# Patient Record
Sex: MALE | Race: WHITE | Employment: OTHER | URBAN - METROPOLITAN AREA
[De-identification: names, ages, dates, MRNs, and addresses within clinical notes are randomized per-mention and may not be internally consistent; named-entity substitution may affect disease eponyms.]

---

## 2018-02-05 RX ORDER — VALSARTAN 80 MG/1
80 TABLET ORAL NIGHTLY
COMMUNITY

## 2018-02-05 RX ORDER — CHOLECALCIFEROL (VITAMIN D3) 1250 MCG
1 CAPSULE ORAL WEEKLY
COMMUNITY

## 2018-02-05 RX ORDER — SIMVASTATIN 80 MG
80 TABLET ORAL NIGHTLY
COMMUNITY

## 2018-02-05 RX ORDER — PAROXETINE HYDROCHLORIDE 20 MG/1
20 TABLET, FILM COATED ORAL NIGHTLY
COMMUNITY

## 2018-02-05 RX ORDER — TRIAMTERENE AND HYDROCHLOROTHIAZIDE 37.5; 25 MG/1; MG/1
1 TABLET ORAL DAILY
COMMUNITY

## 2018-02-05 RX ORDER — METOPROLOL SUCCINATE 100 MG/1
100 TABLET, EXTENDED RELEASE ORAL DAILY
COMMUNITY

## 2018-02-05 RX ORDER — POTASSIUM BICARBONATE 25 MEQ/1
25 TABLET, EFFERVESCENT ORAL DAILY
COMMUNITY

## 2018-02-05 RX ORDER — ALLOPURINOL 300 MG/1
300 TABLET ORAL DAILY
COMMUNITY

## 2018-02-05 RX ORDER — LEVOTHYROXINE SODIUM 0.03 MG/1
25 TABLET ORAL DAILY
COMMUNITY

## 2018-02-05 RX ORDER — M-VIT,TX,IRON,MINS/CALC/FOLIC 27MG-0.4MG
1 TABLET ORAL DAILY
COMMUNITY

## 2018-02-05 RX ORDER — PANTOPRAZOLE SODIUM 40 MG/1
40 TABLET, DELAYED RELEASE ORAL NIGHTLY
COMMUNITY

## 2018-02-07 ENCOUNTER — ANESTHESIA EVENT (OUTPATIENT)
Dept: OPERATING ROOM | Age: 83
End: 2018-02-07
Payer: MEDICARE

## 2018-02-08 ENCOUNTER — ANESTHESIA (OUTPATIENT)
Dept: OPERATING ROOM | Age: 83
End: 2018-02-08
Payer: MEDICARE

## 2018-02-08 ENCOUNTER — HOSPITAL ENCOUNTER (OUTPATIENT)
Age: 83
Setting detail: OUTPATIENT SURGERY
Discharge: HOME | End: 2018-02-08
Attending: OPHTHALMOLOGY | Admitting: OPHTHALMOLOGY
Payer: MEDICARE

## 2018-02-08 VITALS — OXYGEN SATURATION: 100 % | SYSTOLIC BLOOD PRESSURE: 169 MMHG | DIASTOLIC BLOOD PRESSURE: 82 MMHG

## 2018-02-08 VITALS
OXYGEN SATURATION: 99 % | BODY MASS INDEX: 35.89 KG/M2 | SYSTOLIC BLOOD PRESSURE: 159 MMHG | HEIGHT: 66 IN | TEMPERATURE: 97.5 F | DIASTOLIC BLOOD PRESSURE: 69 MMHG | WEIGHT: 223.33 LBS | RESPIRATION RATE: 16 BRPM | HEART RATE: 56 BPM

## 2018-02-08 PROBLEM — H35.371 MACULAR PUCKER, RIGHT EYE: Chronic | Status: ACTIVE | Noted: 2018-02-08

## 2018-02-08 LAB
EKG ATRIAL RATE: 51 BPM
EKG P AXIS: 14 DEGREES
EKG P-R INTERVAL: 218 MS
EKG Q-T INTERVAL: 472 MS
EKG QRS DURATION: 126 MS
EKG QTC CALCULATION (BAZETT): 435 MS
EKG R AXIS: 3 DEGREES
EKG T AXIS: 72 DEGREES
EKG VENTRICULAR RATE: 51 BPM
GFR NON-AFRICAN AMERICAN: 54 ML/MIN
GFR SERPL CREATININE-BSD FRML MDRD: >60 ML/MIN
GFR SERPL CREATININE-BSD FRML MDRD: ABNORMAL ML/MIN/{1.73_M2}
GLUCOSE BLD-MCNC: 135 MG/DL (ref 74–100)
POC CHLORIDE: 106 MMOL/L (ref 98–107)
POC CREATININE: 1.28 MG/DL (ref 0.51–1.19)
POC POTASSIUM: 4.3 MMOL/L (ref 3.5–4.5)
POC SODIUM: 143 MMOL/L (ref 138–146)

## 2018-02-08 PROCEDURE — 2500000003 HC RX 250 WO HCPCS: Performed by: NURSE ANESTHETIST, CERTIFIED REGISTERED

## 2018-02-08 PROCEDURE — 93005 ELECTROCARDIOGRAM TRACING: CPT

## 2018-02-08 PROCEDURE — 6360000002 HC RX W HCPCS: Performed by: OPHTHALMOLOGY

## 2018-02-08 PROCEDURE — 3600000004 HC SURGERY LEVEL 4 BASE: Performed by: OPHTHALMOLOGY

## 2018-02-08 PROCEDURE — 6370000000 HC RX 637 (ALT 250 FOR IP): Performed by: OPHTHALMOLOGY

## 2018-02-08 PROCEDURE — 3700000000 HC ANESTHESIA ATTENDED CARE: Performed by: OPHTHALMOLOGY

## 2018-02-08 PROCEDURE — 2580000003 HC RX 258: Performed by: ANESTHESIOLOGY

## 2018-02-08 PROCEDURE — 7100000040 HC SPAR PHASE II RECOVERY - FIRST 15 MIN: Performed by: OPHTHALMOLOGY

## 2018-02-08 PROCEDURE — 2580000003 HC RX 258: Performed by: NURSE ANESTHETIST, CERTIFIED REGISTERED

## 2018-02-08 PROCEDURE — 3600000014 HC SURGERY LEVEL 4 ADDTL 15MIN: Performed by: OPHTHALMOLOGY

## 2018-02-08 PROCEDURE — 84295 ASSAY OF SERUM SODIUM: CPT

## 2018-02-08 PROCEDURE — 6360000002 HC RX W HCPCS: Performed by: NURSE ANESTHETIST, CERTIFIED REGISTERED

## 2018-02-08 PROCEDURE — 82565 ASSAY OF CREATININE: CPT

## 2018-02-08 PROCEDURE — 3700000001 HC ADD 15 MINUTES (ANESTHESIA): Performed by: OPHTHALMOLOGY

## 2018-02-08 PROCEDURE — 2580000003 HC RX 258: Performed by: OPHTHALMOLOGY

## 2018-02-08 PROCEDURE — 84132 ASSAY OF SERUM POTASSIUM: CPT

## 2018-02-08 PROCEDURE — 2500000003 HC RX 250 WO HCPCS: Performed by: OPHTHALMOLOGY

## 2018-02-08 PROCEDURE — 7100000041 HC SPAR PHASE II RECOVERY - ADDTL 15 MIN: Performed by: OPHTHALMOLOGY

## 2018-02-08 PROCEDURE — 82947 ASSAY GLUCOSE BLOOD QUANT: CPT

## 2018-02-08 PROCEDURE — 2500000003 HC RX 250 WO HCPCS

## 2018-02-08 PROCEDURE — 82435 ASSAY OF BLOOD CHLORIDE: CPT

## 2018-02-08 RX ORDER — LIDOCAINE HYDROCHLORIDE 10 MG/ML
INJECTION, SOLUTION INFILTRATION; PERINEURAL PRN
Status: DISCONTINUED | OUTPATIENT
Start: 2018-02-08 | End: 2018-02-08 | Stop reason: SDUPTHER

## 2018-02-08 RX ORDER — DEXTROSE MONOHYDRATE 25 G/50ML
INJECTION, SOLUTION INTRAVENOUS PRN
Status: DISCONTINUED | OUTPATIENT
Start: 2018-02-08 | End: 2018-02-08 | Stop reason: HOSPADM

## 2018-02-08 RX ORDER — BALANCED SALT SOLUTION ENRICHED WITH BICARBONATE, DEXTROSE, AND GLUTATHIONE
KIT INTRAOCULAR PRN
Status: DISCONTINUED | OUTPATIENT
Start: 2018-02-08 | End: 2018-02-08 | Stop reason: HOSPADM

## 2018-02-08 RX ORDER — SODIUM CHLORIDE, SODIUM LACTATE, POTASSIUM CHLORIDE, CALCIUM CHLORIDE 600; 310; 30; 20 MG/100ML; MG/100ML; MG/100ML; MG/100ML
INJECTION, SOLUTION INTRAVENOUS CONTINUOUS PRN
Status: DISCONTINUED | OUTPATIENT
Start: 2018-02-08 | End: 2018-02-08 | Stop reason: SDUPTHER

## 2018-02-08 RX ORDER — PHENYLEPHRINE HYDROCHLORIDE 100 MG/ML
1 SOLUTION/ DROPS OPHTHALMIC EVERY 5 MIN PRN
Status: COMPLETED | OUTPATIENT
Start: 2018-02-08 | End: 2018-02-08

## 2018-02-08 RX ORDER — PROPOFOL 10 MG/ML
INJECTION, EMULSION INTRAVENOUS PRN
Status: DISCONTINUED | OUTPATIENT
Start: 2018-02-08 | End: 2018-02-08 | Stop reason: SDUPTHER

## 2018-02-08 RX ORDER — TROPICAMIDE 10 MG/ML
1 SOLUTION/ DROPS OPHTHALMIC EVERY 5 MIN PRN
Status: COMPLETED | OUTPATIENT
Start: 2018-02-08 | End: 2018-02-08

## 2018-02-08 RX ORDER — SODIUM CHLORIDE, SODIUM LACTATE, POTASSIUM CHLORIDE, CALCIUM CHLORIDE 600; 310; 30; 20 MG/100ML; MG/100ML; MG/100ML; MG/100ML
INJECTION, SOLUTION INTRAVENOUS CONTINUOUS
Status: DISCONTINUED | OUTPATIENT
Start: 2018-02-08 | End: 2018-02-08 | Stop reason: HOSPADM

## 2018-02-08 RX ORDER — CEFTAZIDIME 1 G/1
INJECTION, POWDER, FOR SOLUTION INTRAMUSCULAR; INTRAVENOUS PRN
Status: DISCONTINUED | OUTPATIENT
Start: 2018-02-08 | End: 2018-02-08 | Stop reason: HOSPADM

## 2018-02-08 RX ORDER — INDOCYANINE GREEN AND WATER 25 MG
KIT INJECTION PRN
Status: DISCONTINUED | OUTPATIENT
Start: 2018-02-08 | End: 2018-02-08 | Stop reason: HOSPADM

## 2018-02-08 RX ORDER — ERYTHROMYCIN 5 MG/G
OINTMENT OPHTHALMIC PRN
Status: DISCONTINUED | OUTPATIENT
Start: 2018-02-08 | End: 2018-02-08 | Stop reason: HOSPADM

## 2018-02-08 RX ORDER — LIDOCAINE HYDROCHLORIDE 20 MG/ML
INJECTION, SOLUTION INFILTRATION; PERINEURAL PRN
Status: DISCONTINUED | OUTPATIENT
Start: 2018-02-08 | End: 2018-02-08 | Stop reason: HOSPADM

## 2018-02-08 RX ORDER — TOBRAMYCIN AND DEXAMETHASONE 3; 1 MG/ML; MG/ML
1 SUSPENSION/ DROPS OPHTHALMIC ONCE
Status: COMPLETED | OUTPATIENT
Start: 2018-02-08 | End: 2018-02-08

## 2018-02-08 RX ORDER — BUPIVACAINE HYDROCHLORIDE 7.5 MG/ML
INJECTION, SOLUTION EPIDURAL; RETROBULBAR PRN
Status: DISCONTINUED | OUTPATIENT
Start: 2018-02-08 | End: 2018-02-08 | Stop reason: HOSPADM

## 2018-02-08 RX ORDER — LIDOCAINE HYDROCHLORIDE 10 MG/ML
1 INJECTION, SOLUTION EPIDURAL; INFILTRATION; INTRACAUDAL; PERINEURAL
Status: DISCONTINUED | OUTPATIENT
Start: 2018-02-08 | End: 2018-02-08 | Stop reason: HOSPADM

## 2018-02-08 RX ADMIN — TROPICAMIDE 1 DROP: 10 SOLUTION/ DROPS OPHTHALMIC at 08:18

## 2018-02-08 RX ADMIN — TROPICAMIDE 1 DROP: 10 SOLUTION/ DROPS OPHTHALMIC at 08:03

## 2018-02-08 RX ADMIN — SODIUM CHLORIDE, POTASSIUM CHLORIDE, SODIUM LACTATE AND CALCIUM CHLORIDE: 600; 310; 30; 20 INJECTION, SOLUTION INTRAVENOUS at 09:20

## 2018-02-08 RX ADMIN — PHENYLEPHRINE HYDROCHLORIDE 1 DROP: 100 SOLUTION/ DROPS OPHTHALMIC at 08:18

## 2018-02-08 RX ADMIN — TOBRAMYCIN AND DEXAMETHASONE 1 DROP: 3; 1 SUSPENSION/ DROPS OPHTHALMIC at 08:39

## 2018-02-08 RX ADMIN — SODIUM CHLORIDE, POTASSIUM CHLORIDE, SODIUM LACTATE AND CALCIUM CHLORIDE: 600; 310; 30; 20 INJECTION, SOLUTION INTRAVENOUS at 08:18

## 2018-02-08 RX ADMIN — PROPOFOL 70 MG: 10 INJECTION, EMULSION INTRAVENOUS at 09:26

## 2018-02-08 RX ADMIN — PHENYLEPHRINE HYDROCHLORIDE 1 DROP: 100 SOLUTION/ DROPS OPHTHALMIC at 08:03

## 2018-02-08 RX ADMIN — TROPICAMIDE 1 DROP: 10 SOLUTION/ DROPS OPHTHALMIC at 08:37

## 2018-02-08 RX ADMIN — PHENYLEPHRINE HYDROCHLORIDE 1 DROP: 100 SOLUTION/ DROPS OPHTHALMIC at 08:37

## 2018-02-08 RX ADMIN — LIDOCAINE HYDROCHLORIDE 30 MG: 10 INJECTION, SOLUTION INFILTRATION; PERINEURAL at 09:26

## 2018-02-08 ASSESSMENT — PULMONARY FUNCTION TESTS
PIF_VALUE: 1
PIF_VALUE: 0
PIF_VALUE: 1

## 2018-02-08 ASSESSMENT — PAIN SCALES - GENERAL
PAINLEVEL_OUTOF10: 0
PAINLEVEL_OUTOF10: 0

## 2018-02-08 ASSESSMENT — ENCOUNTER SYMPTOMS: SHORTNESS OF BREATH: 0

## 2018-02-08 ASSESSMENT — PAIN - FUNCTIONAL ASSESSMENT: PAIN_FUNCTIONAL_ASSESSMENT: 0-10

## 2018-02-08 NOTE — ANESTHESIA POSTPROCEDURE EVALUATION
Department of Anesthesiology  Postprocedure Note    Patient: Deanna Vitale  MRN: [id number]  YOB: 1935  Date of evaluation: 2/8/2018  Time:  11:32 AM     Procedure Summary     Date:  02/08/18 Room / Location:  [facility] OR  / [facility] OR    Anesthesia Start:  0920 Anesthesia Stop:  1300    Procedure:  VITRECTOMY 25 GAUGE, MEMBRANE PEELING (Right ) Diagnosis:  (MACULAR PUCKER, VITREOUS OPACITIES RIGHT EYE)    Surgeon:  Reji Ramirez MD Responsible Provider:  Melina Samson MD    Anesthesia Type:  MAC ASA Status:  3          Anesthesia Type: MAC    María Phase I:      María Phase II: María Score: 10    Last vitals: Reviewed and per EMR flowsheets.        Anesthesia Post Evaluation    Patient location during evaluation: PACU  Patient participation: complete - patient participated  Level of consciousness: awake and alert  Pain score: 0  Airway patency: patent  Nausea & Vomiting: no nausea and no vomiting  Complications: no  Cardiovascular status: hemodynamically stable  Respiratory status: acceptable  Hydration status: euvolemic

## 2018-02-08 NOTE — OP NOTE
posterior pole,  however. ICG was placed over the posterior pole and allowed to remain in  place for 30 seconds. It was removed with silicone-tipped extrusion. The  posterior pole stained in a blotchy manner with ICG. Most of the posterior  pole did not stain indicating a large epiretinal membrane. The ILM was  peeled beginning in superior temporal quadrant. This was peeled in  can-opener fashion. It came off as one large sheet over almost the entire  posterior pole. No bleeding occurred. It was reasonably strongly attached  in some areas. No residual membrane was seen. Scleral depression showed  no peripheral tears. There was no bleeding at any time. Trocars removed  and the sites were self-sealing. 50 mg of ceftazidime was injected 17  ounces in the inferonasal quadrant. The eye was patched in the usual  fashion. The patient taken to recovery room in good condition. Giselle Barker    D: 02/08/2018 10:36:33       T: 02/08/2018 10:37:56     [email or web address]  Job#: [id number]     Doc#: [id number]    CC:

## 2020-07-08 ENCOUNTER — HOSPITAL ENCOUNTER (OUTPATIENT)
Dept: GENERAL RADIOLOGY | Age: 85
Discharge: HOME | End: 2020-07-10
Payer: MEDICARE

## 2020-07-08 ENCOUNTER — HOSPITAL ENCOUNTER (OUTPATIENT)
Age: 85
Discharge: HOME | End: 2020-07-10
Payer: MEDICARE

## 2020-07-08 ENCOUNTER — HOSPITAL ENCOUNTER (OUTPATIENT)
Dept: WOUND CARE | Age: 85
Discharge: HOME | End: 2020-07-08
Payer: MEDICARE

## 2020-07-08 PROBLEM — B35.3 TINEA PEDIS OF BOTH FEET: Status: ACTIVE | Noted: 2020-07-08

## 2020-07-08 PROBLEM — G60.9 IDIOPATHIC NEUROPATHY: Status: ACTIVE | Noted: 2020-07-08

## 2020-07-08 PROBLEM — B35.1 ONYCHOMYCOSIS: Status: ACTIVE | Noted: 2020-07-08

## 2020-07-08 PROBLEM — M20.41 HAMMERTOE OF SECOND TOE OF RIGHT FOOT: Status: ACTIVE | Noted: 2020-07-08

## 2020-07-08 PROBLEM — L97.512 NEUROPATHIC ULCER OF TOE OF RIGHT FOOT WITH FAT LAYER EXPOSED (HCC): Status: ACTIVE | Noted: 2020-07-08

## 2020-07-08 PROCEDURE — 11042 DBRDMT SUBQ TIS 1ST 20SQCM/<: CPT

## 2020-07-08 PROCEDURE — 99213 OFFICE O/P EST LOW 20 MIN: CPT

## 2020-07-08 PROCEDURE — 73630 X-RAY EXAM OF FOOT: CPT

## 2020-07-08 PROCEDURE — 6370000000 HC RX 637 (ALT 250 FOR IP): Performed by: PODIATRIST

## 2020-07-08 RX ORDER — KETOCONAZOLE 20 MG/G
CREAM TOPICAL
Qty: 1 TUBE | Refills: 3 | Status: SHIPPED | OUTPATIENT
Start: 2020-07-08

## 2020-07-08 RX ORDER — LIDOCAINE HYDROCHLORIDE 20 MG/ML
JELLY TOPICAL PRN
Status: DISCONTINUED | OUTPATIENT
Start: 2020-07-08 | End: 2020-07-09 | Stop reason: HOSPADM

## 2020-07-08 RX ADMIN — LIDOCAINE HYDROCHLORIDE: 20 JELLY TOPICAL at 09:39

## 2020-07-08 ASSESSMENT — ENCOUNTER SYMPTOMS
ROS SKIN COMMENTS: RIGHT 2ND TOE
VOMITING: 0
NAUSEA: 0
DIARRHEA: 0

## 2020-07-08 NOTE — PROGRESS NOTES
SURGERY  1998    ANGIOPLASTY WITH 1 STENT    CAROTID ENDARTERECTOMY  2006    CHOLECYSTECTOMY  2015    COLONOSCOPY      JOINT REPLACEMENT Left     KNEE    CA RELEAS VITREOUS,SUBRET/CHOROID FLUID Right 2018    VITRECTOMY 25 GAUGE, MEMBRANE PEELING performed by Mohini Guan MD at [address]  2015    CAUTERIZE BLEEDER    VITRECTOMY Right 2018    with membrane stripping       FAMILY HISTORY    Family History   Problem Relation Age of Onset    Cancer Mother         OVARIAN    Cancer Father         PROSTATE    Heart Disease Father         CAD-OPEN HEART    Other Brother 71        BLOOD CLOT TO BRAIN    Other Brother 43         IN AUTO ACCIDENT       SOCIAL HISTORY    Social History     Tobacco Use    Smoking status: Never Smoker    Smokeless tobacco: Never Used   Substance Use Topics    Alcohol use: No    Drug use: No       ALLERGIES    Allergies   Allergen Reactions    Codeine Other (See Comments)     HALLUCINATIONS    Feldene [Piroxicam] Other (See Comments)     RAISES BLOOD PRESSURE         MEDICATIONS    Current Outpatient Medications on File Prior to Encounter   Medication Sig Dispense Refill    levothyroxine (SYNTHROID) 25 MCG tablet Take 25 mcg by mouth Daily      simvastatin (ZOCOR) 80 MG tablet Take 80 mg by mouth nightly      potassium bicarbonate (K-LYTE) 25 MEQ disintegrating tablet Take 25 mEq by mouth daily      allopurinol (ZYLOPRIM) 300 MG tablet Take 300 mg by mouth daily      Cholecalciferol (VITAMIN D3) 35790 units CAPS Take 1 capsule by mouth once a week TAKES ON       aspirin 81 MG tablet Take 81 mg by mouth daily      pantoprazole (PROTONIX) 40 MG tablet Take 40 mg by mouth nightly      valsartan (DIOVAN) 80 MG tablet Take 80 mg by mouth nightly      metoprolol succinate (TOPROL XL) 100 MG extended release tablet Take 100 mg by mouth daily      PARoxetine (PAXIL) 20 MG tablet Take 20 mg by mouth nightly      triamterene-hydrochlorothiazide (MAXZIDE-25) 37.5-25 MG per tablet Take 1 tablet by mouth daily      Multiple Vitamins-Minerals (THERAPEUTIC MULTIVITAMIN-MINERALS) tablet Take 1 tablet by mouth daily       No current facility-administered medications on file prior to encounter. REVIEW OF SYSTEMS    Review of Systems   Constitutional: Negative for chills and fever. Cardiovascular: Negative for leg swelling. Gastrointestinal: Negative for diarrhea, nausea and vomiting. Musculoskeletal: Negative for myalgias. Skin: Positive for wound. Right 2nd toe   Neurological: Positive for numbness. Negative for weakness. Both feet   Hematological: Does not bruise/bleed easily. Psychiatric/Behavioral: The patient is not nervous/anxious. Objective: There were no vitals taken for this visit. Wt Readings from Last 3 Encounters:   02/08/18 223 lb 5.2 oz (101.3 kg)       Physical Exam:  General:  Alert and oriented x3. In no acute distress. Lower Extremity Physical Exam:    Vascular: DP pulses are not palpable, Bilateral. PT pulses are not palpable, Bilateral. CFT <3 seconds to all digits, Bilateral.  No edema, Bilateral.  Hair growth is absent to the level of the digits, Bilateral.  Localized edema present to right second digit. Neuro: Saph/sural/SP/DP/plantar sensation diminished to light touch. Musculoskeletal: EHL/FHL/GS/TA gross motor intact. Gross deformity is present hammertoe second digit right. Dermatologic: Open wound present to distal right second toe as documented in detail below. Wound extends to the level of subcutaneous tissue. Periwound skin is hyperkeratotic. Wound does not probe to bone. No erythema. No purulent drainage. No increased calor. No fluctuance, crepitus. Interdigital maceration absent, Bilateral.  Nails 1-10 are thickened, elongated, dystrophic, subungual debris.        Assessment:        Active Hospital Problems    Diagnosis Date Noted Documentation Flow Sheet    Wound/Ulcer #: 1    Post Debridement Measurements:  Wound/Ulcer Descriptions are Pre Debridement except measurements:    Wound 07/08/20 Toe (Comment  which one) Right #1 (Active)   Wound Image   7/8/2020  9:30 AM   Dressing Status New drainage; Old drainage 7/8/2020  9:30 AM   Dressing Changed Changed/New 7/8/2020  9:30 AM   Wound Cleansed Rinsed/Irrigated with saline 7/8/2020  9:30 AM   Wound Length (cm) 0.5 cm 7/8/2020  9:30 AM   Wound Width (cm) 0.7 cm 7/8/2020  9:30 AM   Wound Depth (cm) 0.4 cm 7/8/2020  9:30 AM   Wound Surface Area (cm^2) 0.35 cm^2 7/8/2020  9:30 AM   Wound Volume (cm^3) 0.14 cm^3 7/8/2020  9:30 AM   Post-Procedure Length (cm) 0.5 cm 7/8/2020  9:30 AM   Post-Procedure Width (cm) 0.7 cm 7/8/2020  9:30 AM   Post-Procedure Depth (cm) 0.4 cm 7/8/2020  9:30 AM   Post-Procedure Surface Area (cm^2) 0.35 cm^2 7/8/2020  9:30 AM   Post-Procedure Volume (cm^3) 0.14 cm^3 7/8/2020  9:30 AM   Wound Assessment Drainage;Pink;Red;Bleeding 7/8/2020  9:30 AM   Drainage Amount Moderate 7/8/2020  9:30 AM   Drainage Description Serosanguinous 7/8/2020  9:30 AM   Odor None 7/8/2020  9:30 AM   Margins Defined edges 7/8/2020  9:30 AM   Julieta-wound Assessment Clean;Dry; Intact 7/8/2020  9:30 AM   [place]%Wound Bed 50 7/8/2020  9:30 AM   Red%Wound Bed 50 7/8/2020  9:30 AM   Number of days: 0          Percent of Wound(s)/Ulcer(s) Debrided: 100%    Total Surface Area Debrided:  0.35 sq cm     Diabetic/Pressure/Non Pressure Ulcers only:  Ulcer: Other: neuropathic ulcer     Estimated Blood Loss:  Minimal    Hemostasis Achieved:  by pressure    Procedural Pain:  0  / 10     Post Procedural Pain:  0 / 10     Response to treatment:  Well tolerated by patient. Written patient discharge instructions given to patient and signed by patient or POA.          Discharge Instructions          [facility] -Phone: [phone] Fax: [phone]   Visit  Discharge Instructions / Physician Orders    DATE: 7/8/2020     Home Care: NA     SUPPLIES ORDERED THRU: NA     Wound Location:  Right Second Toe     Cleanse with: Liquid antibacterial soap and water, rinse well      Dressing Orders: Promogran, Bandaid     Frequency: Daily     Additional Orders: Increase protein to diet (meat, cheese, eggs, fish, peanut butter, nuts and beans)  Multivitamin daily  ELEVATE LEGS AS MUCH AS POSSIBLE  Get X-Ray of toe     Your next appointment with [facility] is in 1 week                                                                                                   ROOM TYPE   [x] CHAIR     [] STRETCHER  [] EITHER             (Please note your next appointment above and if you are unable to keep, kindly give a 24 hour notice. Thank you.)     If you experience any of the following, please call the [facility] during business hours:  [phone]     * Increase in Pain  * Temperature over 101  * Increase in drainage from your wound  * Drainage with a foul odor  * Bleeding  * Increase in swelling  * Need for compression bandage changes due to slippage, breakthrough drainage. If you need medical attention outside of the business hours of the [facility] please contact your PCP or go to the nearest emergency room. The information contained in the After Visit Summary has been reviewed with me, the patient and/or responsible adult, by my health care provider(s). I had the opportunity to ask questions regarding this information.  I have elected to receive;      []After Visit Summary  [x]Comprehensive Discharge Instruction      Patient signature______________________________________Date:________  Electronically signed by Chasidy Meza RN on 7/8/2020 at 10:06 AM  Electronically signed by Avi Castillo DPM on 7/8/2020 at 8:39 AM            Electronically signed by Avi Castillo DPM on 7/8/2020 at 10:14 AM

## 2020-07-15 ENCOUNTER — HOSPITAL ENCOUNTER (OUTPATIENT)
Dept: WOUND CARE | Age: 85
Discharge: HOME | End: 2020-07-15
Payer: MEDICARE

## 2020-07-15 VITALS
HEIGHT: 66 IN | HEART RATE: 57 BPM | BODY MASS INDEX: 35.84 KG/M2 | TEMPERATURE: 98.2 F | RESPIRATION RATE: 17 BRPM | DIASTOLIC BLOOD PRESSURE: 64 MMHG | WEIGHT: 223 LBS | SYSTOLIC BLOOD PRESSURE: 133 MMHG

## 2020-07-15 PROBLEM — M86.9 OSTEOMYELITIS OF SECOND TOE OF RIGHT FOOT (HCC): Status: ACTIVE | Noted: 2020-07-15

## 2020-07-15 PROCEDURE — 6370000000 HC RX 637 (ALT 250 FOR IP): Performed by: PODIATRIST

## 2020-07-15 PROCEDURE — 11042 DBRDMT SUBQ TIS 1ST 20SQCM/<: CPT

## 2020-07-15 RX ORDER — LIDOCAINE HYDROCHLORIDE 20 MG/ML
JELLY TOPICAL PRN
Status: DISCONTINUED | OUTPATIENT
Start: 2020-07-15 | End: 2020-07-16 | Stop reason: HOSPADM

## 2020-07-15 RX ADMIN — LIDOCAINE HYDROCHLORIDE 3 ML: 20 JELLY TOPICAL at 10:51

## 2020-07-15 ASSESSMENT — ENCOUNTER SYMPTOMS
DIARRHEA: 0
NAUSEA: 0
VOMITING: 0
ROS SKIN COMMENTS: RIGHT 2ND TOE

## 2020-07-15 ASSESSMENT — PAIN SCALES - GENERAL: PAINLEVEL_OUTOF10: 0

## 2020-07-15 NOTE — PROGRESS NOTES
[address]   Progress Note and Procedure Note      Kb Gonzalez  MEDICAL RECORD NUMBER:  [id number]  AGE: 82 y.o. GENDER: male  : 1935  EPISODE DATE:  7/15/2020    Subjective:     Chief Complaint   Patient presents with    Wound Check     left 2nd toe         HISTORY of PRESENT ILLNESS HPI     Kb Gonzalez is a 82 y.o. male who presents today for wound/ulcer evaluation. Interval History:  Patient obtained XR from last visit which reveals erosion of the distal aspect of the distal phalanx of the right 2nd toe. They did not  the ketoconazole cream from the pharmacy. History of Wound Context: Patient presents with family with ulceration of the right second toe. Patient states that the ulcer has been present for about 1 year now. Denies being diabetic but has neuropathy in both feet. States that it has been swollen and infected multiple times in the past.  States that he has been ambulating in sandals.   Wound/Ulcer Pain Timing/Severity: none  Quality of pain: N/A  Severity:  0 / 10   Modifying Factors: None  Associated Signs/Symptoms: none    Ulcer Identification:  Ulcer Type: arterial, pressure and neuropathic  Contributing Factors: chronic pressure, shear force, arterial insufficiency and decreased tissue oxygenation          PAST MEDICAL HISTORY        Diagnosis Date    Arthritis     KNEES    CAD (coronary artery disease)     ANGIOPLASTY WITH 1 STENT    GERD (gastroesophageal reflux disease)     HX OF BLEEDING ULCER    Gout     ON RX    [place] (hard of hearing)     WEARS BILAT HEARING AIDS    Hyperlipidemia     ON RX    Hypertension     ON RX    Kidney stone     PASSED ON OWN    PVC (premature ventricular contraction) 10/2014    ON RX BETTER NOW    Sleep apnea     C-PAP    Thyroid disease 2017    HYPOTHYROIDISM-ON RX    Vision abnormalities 2016    DISTORTED RIGHT EYE    Wears dentures     UPPER FULL PLATE, LOWER PARTIAL DOES NOT WEAR LOWER    Wears glasses        PAST SURGICAL HISTORY    Past Surgical History:   Procedure Laterality Date    ABDOMEN SURGERY  2015    SURGERY TO DISLODGE GALL STONES FROM PANCREAS    APPENDECTOMY  2003    CARDIAC SURGERY  1998    ANGIOPLASTY WITH 1 STENT    CAROTID ENDARTERECTOMY  2006    CHOLECYSTECTOMY      COLONOSCOPY      JOINT REPLACEMENT Left     KNEE    MI RELEAS VITREOUS,SUBRET/CHOROID FLUID Right 2018    VITRECTOMY 25 GAUGE, MEMBRANE PEELING performed by Michelle Montoya MD at [address] [place] [address] Rd      CAUTERIZE BLEEDER    VITRECTOMY Right 2018    with membrane stripping       FAMILY HISTORY    Family History   Problem Relation Age of Onset    Cancer Mother         OVARIAN    Cancer Father         PROSTATE    Heart Disease Father         CAD-OPEN HEART    Other Brother 71        BLOOD CLOT TO BRAIN    Other Brother 43         IN AUTO ACCIDENT       SOCIAL HISTORY    Social History     Tobacco Use    Smoking status: Never Smoker    Smokeless tobacco: Never Used   Substance Use Topics    Alcohol use: No    Drug use: No       ALLERGIES    Allergies   Allergen Reactions    Codeine Other (See Comments)     HALLUCINATIONS    Feldene [Piroxicam] Other (See Comments)     RAISES BLOOD PRESSURE         MEDICATIONS    Current Outpatient Medications on File Prior to Encounter   Medication Sig Dispense Refill    ketoconazole (NIZORAL) 2 % cream Apply topically BID to nails and skin of both feet.  1 Tube 3    levothyroxine (SYNTHROID) 25 MCG tablet Take 25 mcg by mouth Daily      simvastatin (ZOCOR) 80 MG tablet Take 80 mg by mouth nightly      potassium bicarbonate (K-LYTE) 25 MEQ disintegrating tablet Take 25 mEq by mouth daily      allopurinol (ZYLOPRIM) 300 MG tablet Take 300 mg by mouth daily      Cholecalciferol (VITAMIN D3) 23667 units CAPS Take 1 capsule by mouth once a week TAKES ON       aspirin 81 MG tablet Take 81 mg by right second toe as documented in detail below. Wound extends to the level of subcutaneous tissue. Periwound skin is mildly hyperkeratotic. Wound does not probe to bone. No erythema. No purulent drainage. No increased calor. No fluctuance, crepitus. Interdigital maceration absent, Bilateral.  Nails 1-10 are thickened, elongated, dystrophic, subungual debris. XR Right Foot 07/08/20  Impression    1. Soft tissue swelling in the right 2nd toe potentially due to cellulitis    with no definite visualization of the reported ulceration.  There is    suspicion for osteomyelitis involving the distal tuft of the underlying right    2nd distal phalanx, but no findings of necrotizing fasciitis are identified. Consider MRI. 2. Mild osteoarthritic changes of the right talonavicular and 1st    metatarsophalangeal joints. 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 Directed not to put cream into the wound. Fibracol dressing and a dry sterile dressing to ulceration of right second toe daily. Surgical shoe with offloading cut out in the insert in the bottom of the shoe. Was instructed to minimize ambulation and use the heel during ambulation. He is educated on the etiology of this wound including neuropathy and chronic pressure. Will plan for MRI of the right foot to determine the extent of the infection and level of possible amputation.      PVR/PPG as we are Post-Procedure Depth (cm) 0.2 cm 07/15/20 1047   Post-Procedure Surface Area (cm^2) 0.16 cm^2 07/15/20 1047   Post-Procedure Volume (cm^3) 0.03 cm^3 07/15/20 1047   Wound Assessment Drainage;[place] 07/15/20 1047   Drainage Amount Moderate 07/15/20 1047   Drainage Description Sanguinous 07/15/20 1047   Odor None 07/15/20 1047   Margins Defined edges 07/15/20 1047   Julieta-wound Assessment Clean;Dry; Intact 07/08/20 0930   Pink%Wound Bed 0 07/15/20 1047   Red%Wound Bed 60 07/15/20 1047   Yellow%Wound Bed 40 07/15/20 1047   Number of days: 7          Percent of Wound(s)/Ulcer(s) Debrided: 100%    Total Surface Area Debrided:  0.16 sq cm     Diabetic/Pressure/Non Pressure Ulcers only:  Ulcer: Other: neuropathic ulcer     Estimated Blood Loss:  Minimal    Hemostasis Achieved:  by pressure    Procedural Pain:  0  / 10     Post Procedural Pain:  0 / 10     Response to treatment:  Well tolerated by patient. Written patient discharge instructions given to patient and signed by patient or POA. Discharge Instructions          [facility] -Phone: [phone] Fax: [phone]             Visit  Discharge Instructions / Physician Orders     DATE: 7/15/2020     Home Care: NA     SUPPLIES ORDERED THRU: NA     Wound Location:  Right Second Toe     Cleanse with: Liquid antibacterial soap and water, rinse well      Dressing Orders: Promogran, Bandaid     Frequency: Daily     Additional Orders: Increase protein to diet (meat, cheese, eggs, fish, peanut butter, nuts and beans)  Multivitamin daily  ELEVATE LEGS AS MUCH AS POSSIBLE  Get MRI of toe  Get vascular studies performed     Your next appointment with [address] [place] [address] is in 1 week     ROOM TYPE   [x]? CHAIR     []? STRETCHER  []? EITHER             (Please note your next appointment above and if you are unable to keep, kindly give a 24 hour notice.  Thank you.)     If you experience any of the following, please call the [address] [place] [address] during business hours:  [phone]     * Increase in Pain  * Temperature over 101  * Increase in drainage from your wound  * Drainage with a foul odor  * Bleeding  * Increase in swelling  * Need for compression bandage changes due to slippage, breakthrough drainage.     If you need medical attention outside of the business hours of the [address] [place] [address] Road please contact your PCP or go to the nearest emergency room. The information contained in the After Visit Summary has been reviewed with me, the patient and/or responsible adult, by my health care provider(s). I had the opportunity to ask questions regarding this information. I have elected to receive;      []? After Visit Summary  [x]? Comprehensive Discharge Instruction        Patient signature______________________________________Date:________  Electronically signed by Jayme Stapleton RN on 7/15/2020 at 11:02 AM  Electronically signed by Korin Ramirez DPM on 7/15/2020 at 9:40 AM          Electronically signed by Korin Ramirez DPM on 7/15/2020 at 11:08 AM

## 2020-07-18 ENCOUNTER — HOSPITAL ENCOUNTER (OUTPATIENT)
Dept: MRI IMAGING | Age: 85
Discharge: HOME | End: 2020-07-20
Payer: MEDICARE

## 2020-07-18 PROCEDURE — 73718 MRI LOWER EXTREMITY W/O DYE: CPT

## 2020-07-22 ENCOUNTER — HOSPITAL ENCOUNTER (OUTPATIENT)
Dept: WOUND CARE | Age: 85
Discharge: HOME | End: 2020-07-22

## 2020-07-22 ENCOUNTER — TELEPHONE (OUTPATIENT)
Dept: WOUND CARE | Age: 85
End: 2020-07-22

## 2020-07-24 ENCOUNTER — HOSPITAL ENCOUNTER (OUTPATIENT)
Dept: PREADMISSION TESTING | Age: 85
Setting detail: SPECIMEN
Discharge: HOME | End: 2020-07-28
Payer: MEDICARE

## 2020-07-24 ENCOUNTER — HOSPITAL ENCOUNTER (OUTPATIENT)
Dept: PREADMISSION TESTING | Age: 85
Setting detail: SPECIMEN
Discharge: HOME | End: 2020-07-28

## 2020-07-24 VITALS
HEART RATE: 55 BPM | BODY MASS INDEX: 34.37 KG/M2 | SYSTOLIC BLOOD PRESSURE: 141 MMHG | RESPIRATION RATE: 16 BRPM | HEIGHT: 66 IN | TEMPERATURE: 97.5 F | OXYGEN SATURATION: 98 % | DIASTOLIC BLOOD PRESSURE: 67 MMHG | WEIGHT: 213.85 LBS

## 2020-07-24 LAB
ANION GAP SERPL CALCULATED.3IONS-SCNC: 11 MMOL/L (ref 9–17)
BUN BLDV-MCNC: 27 MG/DL (ref 8–23)
CHLORIDE BLD-SCNC: 102 MMOL/L (ref 98–107)
CO2: 28 MMOL/L (ref 20–31)
CREAT SERPL-MCNC: 1.21 MG/DL (ref 0.7–1.2)
GFR AFRICAN AMERICAN: >60 ML/MIN
GFR NON-AFRICAN AMERICAN: 57 ML/MIN
GFR SERPL CREATININE-BSD FRML MDRD: ABNORMAL ML/MIN/{1.73_M2}
GFR SERPL CREATININE-BSD FRML MDRD: ABNORMAL ML/MIN/{1.73_M2}
HCT VFR BLD CALC: 45.8 % (ref 40.7–50.3)
HEMOGLOBIN: 14.6 G/DL (ref 13–17)
MCH RBC QN AUTO: 31.3 PG (ref 25.2–33.5)
MCHC RBC AUTO-ENTMCNC: 31.9 G/DL (ref 28.4–34.8)
MCV RBC AUTO: 98.3 FL (ref 82.6–102.9)
NRBC AUTOMATED: 0 PER 100 WBC
PDW BLD-RTO: 13.8 % (ref 11.8–14.4)
PLATELET # BLD: 159 K/UL (ref 138–453)
PMV BLD AUTO: 10.2 FL (ref 8.1–13.5)
POTASSIUM SERPL-SCNC: 4.6 MMOL/L (ref 3.7–5.3)
RBC # BLD: 4.66 M/UL (ref 4.21–5.77)
SODIUM BLD-SCNC: 141 MMOL/L (ref 135–144)
WBC # BLD: 8.2 K/UL (ref 3.5–11.3)

## 2020-07-24 PROCEDURE — 84520 ASSAY OF UREA NITROGEN: CPT

## 2020-07-24 PROCEDURE — 36415 COLL VENOUS BLD VENIPUNCTURE: CPT

## 2020-07-24 PROCEDURE — 82565 ASSAY OF CREATININE: CPT

## 2020-07-24 PROCEDURE — 93005 ELECTROCARDIOGRAM TRACING: CPT | Performed by: ANESTHESIOLOGY

## 2020-07-24 PROCEDURE — 85027 COMPLETE CBC AUTOMATED: CPT

## 2020-07-24 PROCEDURE — 80051 ELECTROLYTE PANEL: CPT

## 2020-07-24 PROCEDURE — U0003 INFECTIOUS AGENT DETECTION BY NUCLEIC ACID (DNA OR RNA); SEVERE ACUTE RESPIRATORY SYNDROME CORONAVIRUS 2 (SARS-COV-2) (CORONAVIRUS DISEASE [COVID-19]), AMPLIFIED PROBE TECHNIQUE, MAKING USE OF HIGH THROUGHPUT TECHNOLOGIES AS DESCRIBED BY CMS-2020-01-R: HCPCS

## 2020-07-24 ASSESSMENT — PAIN SCALES - GENERAL: PAINLEVEL_OUTOF10: 0

## 2020-07-24 NOTE — PRE-PROCEDURE INSTRUCTIONS
[facility] ON Tuesday, July 28,2020  at 1:00 pm      When you arrive to the hospital pull to front of building. Stay in car and call [phone] and we will come out to get you. No visitors at this time    Continue to take your home medications as you normally do up to and including the night before surgery with the exception of any blood thinning medications. Please stop any blood thinning medications as directed by your surgeon or prescribing physician. Failure to stop certain medications may interfere with your scheduled surgery. These may include:  Aspirin, Warfarin (Coumadin), Clopidogrel (Plavix), Ibuprofen (Motrin, Advil), Naproxen (Aleve), Meloxicam (Mobic), Celecoxib (Celebrex), Eliquis, Pradaxa, Xarelto, Effient, Fish Oil, Herbal supplements. IPlease take the following medication(s) the day of surgery with a small sip of water:  Metoprolol,levothroxine,pantoprazole   . PREPARING FOR YOUR SURGERY:     Before surgery, you can play an important role in your own health. Because skin is not sterile, we need to be sure that your skin is as free of germs as possible before surgery by carefully washing before surgery. Preparing or prepping skin before surgery can reduce the risk of a surgical site infection.   Do not shave the area of your body where your surgery will be performed unless you received specific permission from your physician. You will need to shower at home the night before surgery and the morning of surgery with a special soap called chlorhexidine gluconate (CHG*). *Not to be used by people allergic to Chlorhexidine Gluconate (CHG). Following these instructions will help you be sure that your skin is clean before surgery. Instructions on cleaning your skin before surgery: The night before your surgery:      You will need to shower with warm water (not hot) and the CHG soap.  Use a clean wash cloth and a clean towel.   Have clean clothes available to put on after the shower.   First wash your hair with regular shampoo. Rinse your hair and body thoroughly to remove the shampoo.  Wash your face and genital area (private parts) with your regular soap or water only. Thoroughly rinse your body with warm water from the neck down.  Turn water off to prevent rinsing the soap off too soon.  With a clean wet washcloth and half of the CHG soap in the bottle, lather your entire body from the neck down. Do not use CHG soap near your eyes or ears to avoid injury to those areas.  Wash thoroughly, paying special attention to the area where your surgery will be performed.  Wash your body gently for five (5) minutes. Avoid scrubbing your skin too hard.  Turn the water back on and rinse your body thoroughly.  Pat yourself dry with a clean, soft towel. Do not apply lotion, cream or powder.  Dress with clean freshly washed clothes. The morning of surgery:     Repeat shower following steps above - using remaining half of CHG soap in bottle. Patient Instructions:    Ravindra Jarrett If you are having any type of anesthesia you are to have nothing to eat or drink after midnight the night before your surgery. This includes gum, hard candy, mints, water or smoking or chewing tobacco.  The only exception to this is a small sip of water to take with any morning dose of heart, blood pressure, or seizure medications. No alcoholic beverages for 24 hours prior to surgery.  Brush your teeth but do not swallow water.  Bring your eyeglasses and case with you. No contacts are to be worn the day of surgery. You also may bring your hearing aids. Most surgical procedures involving anesthesia will require that you remove your dentures prior to surgery.  If you are on C-PAP or Bi-PAP at home and plan on staying in the hospital overnight for your surgery please bring the machine with you.     · Do not wear any jewelry or body piercings day of surgery. Also, NO lotion, perfume or deodorant to be used the day of surgery. No nail polish on the operative extremity (arm/leg surgeries)    · If you are staying overnight with us, please bring a small bag of necessary personal items.  Please wear loose, comfortable clothing. If you are potentially going to have a cast or brace bring clothing that will fit over them.  In case of illness - If you have cold or flu like symptoms (high fever, runny nose, sore throat, cough, etc.) rash, nausea, vomiting, loose stools, and/or recent contact with someone who has a contagious disease (chicken pox, measles, etc.) Please call your doctor before coming to the hospital.         Day of Surgery/Procedure:    As a patient at [facility] you can expect quality medical and nursing care that is centered on your individual needs. Our goal is to make your surgical experience as comfortable as possible    . Transportation After Your Surgery/Procedure: You will need a friend or family member to drive you home after your procedure. Your  must be 25years of age or older and able to sign off on your discharge instructions. A taxi cab or any other form of public transportation is not acceptable. Your friend or family member must stay at the hospital throughout your procedure. Someone must remain with you for the first 24 hours after your surgery if you receive anesthesia or medication. If you do not have someone to stay with you, your procedure may be cancelled.       If you have any other questions regarding your procedure or the day of surgery, please call [phone]      _________________________  ____________________________  Signature (Patient)              Signature (Provider) & date

## 2020-07-24 NOTE — H&P
History and Physical    Pt Name: Noel Alvarez  MRN: [id number]  YOB: 1935  Date of evaluation: 2020  PROGRESS NOTE;  THIS IS STEVE RUSSELL AN 83 YO MALE WHO PRESENTS TODAY FOR A PRE-TESTING APPOINTMENT PRIOR TO A RIGHT SECOND TOE AMPUTATION. THE PATIENT HAD ANGIOPLASTY WITH ONE STENT ABOUT . HE HAS NOT HAD CHEST PAIN OR ANY PROCEDURES SINCE THEN. HE MOVED FROM [address]. HE HAS NOT HAD A CARDIOLOGIST SINCE THE MOVE. HE DENIES DIABETES. HE TAKES ASA 81 MG WHICH HE WILL STOP TODAY. FUNCTIONAL ASSESSMENT ;  1.IS ABLE TO WALK 2 CITY BLOCKS WITHOUT SHORTNESS OF BREATH OR CHEST PAIN. 2.HE IS NOT ABLE TO CLIMB 2 FLIGHTS OF STAIRS WITHOUT SEVERE KNEE PAIN WHICH CAUSES SHORTNESS OF BREATH. HE BELIEVES HE CAN WALK UP  AN INCLINE SLOWLY. HE HAS JAISON AND USES A C-PAP MACHINE NIGHTLY. PHYSICAL EXAM;  PATIENT WEARS BILATERAL HEARING AIDS AND READING GLASSES. HEART ; REGULAR RATE AND RHYTHM, HAS A PRESTON 2/6 HIGH PITCHED  LUNGS ARE CLEAR WITH GOOD AIR EXCHANGE, NO CRACKLES OR WHEEZES. ABDOMEN IS SOFT BOWEL SOUNDS AUDIBLE X 4  PEDAL PULSES BARELY PALPABLE AT + 1 BILATERALLY NO CALF TENDERNESS NO EDEMA    [x] Please see the office CONSULT NOTE   by Dr. Rissa SULLIVANPKARENA  dated 7/15/2020 in [facility] which meets the criteria for the admission History and Physical and is copied below. OBDULIO DUNCAN APRN, ACNP-BC  Electronically signed 2020 at 8:17 AM             Date of Service:  7/15/2020 10:00 AM          Related encounter: RETURN  PATIENT from 7/15/2020 in [address] [place] [address]          Signed        Expand All Collapse All     Show:Clear all  [x]Manual[x]Template[x]Copied    Added by:  [x]Tj Morton DPM    []Russell for details            [address]   Progress Note and Procedure Note        Leo Russell  MEDICAL RECORD NUMBER:  [id number]  AGE: 82 y.o.    GENDER: male  : 1935  EPISODE DATE:  7/15/2020     Subjective:           Chief Complaint Patient presents with    Wound Check       left 2nd toe         HISTORY of PRESENT ILLNESS HPI      Leo Russell is a 82 y.o. male who presents today for wound/ulcer evaluation. Interval History:  Patient obtained XR from last visit which reveals erosion of the distal aspect of the distal phalanx of the right 2nd toe. They did not  the ketoconazole cream from the pharmacy. History of Wound Context: Patient presents with family with ulceration of the right second toe. Patient states that the ulcer has been present for about 1 year now. Denies being diabetic but has neuropathy in both feet. States that it has been swollen and infected multiple times in the past.  States that he has been ambulating in sandals.   Wound/Ulcer Pain Timing/Severity: none  Quality of pain: N/A  Severity:  0 / 10   Modifying Factors: None  Associated Signs/Symptoms: none     Ulcer Identification:  Ulcer Type: arterial, pressure and neuropathic  Contributing Factors: chronic pressure, shear force, arterial insufficiency and decreased tissue oxygenation                                           PAST MEDICAL HISTORY     Past Medical History []Expand by Default       Diagnosis Date    Arthritis       KNEES    CAD (coronary artery disease) 1998     ANGIOPLASTY WITH 1 STENT    GERD (gastroesophageal reflux disease)       HX OF BLEEDING ULCER    Gout       ON RX    [place] (hard of hearing)       WEARS BILAT HEARING AIDS    Hyperlipidemia 2000     ON RX    Hypertension 19988     ON RX    Kidney stone 1983     PASSED ON OWN    PVC (premature ventricular contraction) 10/2014     ON RX BETTER NOW    Sleep apnea       C-PAP    Thyroid disease 12/2017     HYPOTHYROIDISM-ON RX    Vision abnormalities 2016     DISTORTED RIGHT EYE    Wears dentures       UPPER FULL PLATE, LOWER PARTIAL DOES NOT WEAR LOWER    Wears glasses             PAST SURGICAL HISTORY     Past Surgical History[]Expand by Default         Past Surgical History: mg by mouth daily        pantoprazole (PROTONIX) 40 MG tablet Take 40 mg by mouth nightly        valsartan (DIOVAN) 80 MG tablet Take 80 mg by mouth nightly        metoprolol succinate (TOPROL XL) 100 MG extended release tablet Take 100 mg by mouth daily        PARoxetine (PAXIL) 20 MG tablet Take 20 mg by mouth nightly        triamterene-hydrochlorothiazide (MAXZIDE-25) 37.5-25 MG per tablet Take 1 tablet by mouth daily        Multiple Vitamins-Minerals (THERAPEUTIC MULTIVITAMIN-MINERALS) tablet Take 1 tablet by mouth daily          No current facility-administered medications on file prior to encounter. REVIEW OF SYSTEMS     Review of Systems   Constitutional: Negative for chills and fever. Cardiovascular: Negative for leg swelling. Gastrointestinal: Negative for diarrhea, nausea and vomiting. Musculoskeletal: Negative for myalgias. Skin: Positive for wound. Right 2nd toe   Neurological: Positive for numbness. Negative for weakness. Both feet   Hematological: Does not bruise/bleed easily. Psychiatric/Behavioral: The patient is not nervous/anxious. Objective:       /64   Pulse 57   Temp 98.2 °F (36.8 °C) (Oral)   Resp 17   Ht 5' 6\" (1.676 m)   Wt 223 lb (101.2 kg)   BMI 35.99 kg/m²          Wt Readings from Last 3 Encounters:   07/15/20 223 lb (101.2 kg)   02/08/18 223 lb 5.2 oz (101.3 kg)        Physical Exam:  General:  Alert and oriented x3. In no acute distress. Lower Extremity Physical Exam:     Vascular: DP pulses are not palpable, Bilateral. PT pulses are not palpable, Bilateral. CFT <3 seconds to all digits, Bilateral.  No edema, Bilateral.  Hair growth is absent to the level of the digits, Bilateral.  Localized edema present to right second digit. Neuro: Saph/sural/SP/DP/plantar sensation diminished to light touch. Musculoskeletal: EHL/FHL/GS/TA gross motor intact. Gross deformity is present hammertoe second digit right. infection and level of possible amputation. PVR/PPG as we are not able to obtain from previous hospital and patient is unsure whether this was actually done or not     Discussed amputation of the distal aspect of the right 2nd toe vs amputation of the entire toe pending the result of the MRI in order to treat infection. Discussed risks involved with this surgery including non-healing surgical site and risk for more proximal amputation or loss of limb. Procedure Note  Indications:  Based on my examination of this patient's wound(s)/ulcer(s) today, debridement is required to promote healing and evaluate the wound base. Performed by: Valentin Mcmahon DPM     Consent obtained:  Yes     Time out taken:  Yes     Pain Control: Anesthetic  Anesthetic: 2% Lidocaine Gel Topical         Debridement: Excisional Debridement     Using curette and #15 blade scalpel the wound(s)/ulcer(s) was/were sharply debrided down through and including the removal of subcutaneous tissue. Devitalized Tissue Debrided:  fibrin, biofilm, necrotic/eschar and callus     Pre Debridement Measurements:  Are located in the [facility]  Documentation Flow Sheet     Wound/Ulcer #: 1     Post Debridement Measurements:  Wound/Ulcer Descriptions are Pre Debridement except measurements:          Wound 07/08/20 Toe (Comment  which one) Right #1 (Active)   Wound Image   07/08/20 0930   Wound Other 07/15/20 1047   Dressing Status New drainage; Old drainage 07/15/20 1047   Dressing Changed Changed/New 07/15/20 1047   Dressing/Treatment Other (comment) 07/08/20 1023   Wound Cleansed Rinsed/Irrigated with saline 07/15/20 1047   Wound Length (cm) 0.4 cm 07/15/20 1047   Wound Width (cm) 0.4 cm 07/15/20 1047   Wound Depth (cm) 0.2 cm 07/15/20 1047   Wound Surface Area (cm^2) 0.16 cm^2 07/15/20 1047   Change in Wound Size % (l*w) 54.29 07/15/20 1047   Wound Volume (cm^3) 0.03 cm^3 07/15/20 1047   Wound Healing % 79 07/15/20 1047   Post-Procedure Length (cm) 0.4 cm 07/15/20 1047   Post-Procedure Width (cm) 0.4 cm 07/15/20 1047   Post-Procedure Depth (cm) 0.2 cm 07/15/20 1047   Post-Procedure Surface Area (cm^2) 0.16 cm^2 07/15/20 1047   Post-Procedure Volume (cm^3) 0.03 cm^3 07/15/20 1047   Wound Assessment Drainage;[place] 07/15/20 1047   Drainage Amount Moderate 07/15/20 1047   Drainage Description Sanguinous 07/15/20 1047   Odor None 07/15/20 1047   Margins Defined edges 07/15/20 1047   Julieta-wound Assessment Clean;Dry; Intact 07/08/20 0930   Pink%Wound Bed 0 07/15/20 1047   Red%Wound Bed 60 07/15/20 1047   Yellow%Wound Bed 40 07/15/20 1047   Number of days: 7           Percent of Wound(s)/Ulcer(s) Debrided: 100%     Total Surface Area Debrided:  0.16 sq cm      Diabetic/Pressure/Non Pressure Ulcers only:  Ulcer: Other: neuropathic ulcer      Estimated Blood Loss:  Minimal     Hemostasis Achieved:  by pressure     Procedural Pain:  0  / 10      Post Procedural Pain:  0 / 10      Response to treatment:  Well tolerated by patient. Written patient discharge instructions given to patient and signed by patient or POA. Discharge Instructions           [facility] -Phone: [phone] Fax: [phone]             Visit  Discharge Instructions / Physician Orders     DATE: 7/15/2020     Home Care: NA     SUPPLIES ORDERED THRU: NA     Wound Location:  Right Second Toe     Cleanse with: Liquid antibacterial soap and water, rinse well      Dressing Orders: Promogran, Bandaid     Frequency: Daily     Additional Orders: Increase protein to diet (meat, cheese, eggs, fish, peanut butter, nuts and beans)  Multivitamin daily  ELEVATE LEGS AS MUCH AS POSSIBLE  Get MRI of toe  Get vascular studies performed     Your next appointment with [address] [place] [address] is in 1 week     ROOM TYPE   [x]? ? CHAIR     []? ? STRETCHER  []?? EITHER             (Please note your next appointment above and if you are unable to keep, kindly give a 24 hour notice.  Thank you.)     If you experience any of the following, please call the [address] during business hours:  [phone]     * Increase in Pain  * Temperature over 101  * Increase in drainage from your wound  * Drainage with a foul odor  * Bleeding  * Increase in swelling  * Need for compression bandage changes due to slippage, breakthrough drainage. If you need medical attention outside of the business hours of the [address] please contact your PCP or go to the nearest emergency room. The information contained in the After Visit Summary has been reviewed with me, the patient and/or responsible adult, by my health care provider(s). I had the opportunity to ask questions regarding this information. I have elected to receive;      []??After Visit Summary  [x]? ? Comprehensive Discharge Instruction        Patient signature______________________________________Date:________  Electronically signed by Sohail Ridley RN on 7/15/2020 at 11:02 AM  Electronically signed by Arthur Preciado DPM on 7/15/2020 at 9:40 AM             Electronically signed by Arthur Preciado DPM on 7/15/2020 at 11:08 AM

## 2020-07-25 LAB
EKG ATRIAL RATE: 55 BPM
EKG P AXIS: 24 DEGREES
EKG P-R INTERVAL: 236 MS
EKG Q-T INTERVAL: 472 MS
EKG QRS DURATION: 132 MS
EKG QTC CALCULATION (BAZETT): 451 MS
EKG R AXIS: -13 DEGREES
EKG T AXIS: 80 DEGREES
EKG VENTRICULAR RATE: 55 BPM
SARS-COV-2, NAA: NOT DETECTED

## 2020-07-25 PROCEDURE — 93010 ELECTROCARDIOGRAM REPORT: CPT | Performed by: INTERNAL MEDICINE

## 2020-07-26 ENCOUNTER — TELEPHONE (OUTPATIENT)
Dept: PRIMARY CARE CLINIC | Age: 85
End: 2020-07-26

## 2020-07-27 NOTE — PLAN OF CARE
Received call from PAT regarding surgery scheduled for tomorrow. After anesthesia review, the patient will require cardiac clearance prior to undergoing mac/general anesthesia. I called the patient to discuss how to proceed with cardiac clearance. We discussed the option of obtaining cardiac clearance with patient vs performing the surgery under local anesthesia. Patient elects to proceed with local anesthesia only. Returned call to PAT to maintain surgery date and time for tomorrow.

## 2020-07-28 ENCOUNTER — HOSPITAL ENCOUNTER (OUTPATIENT)
Age: 85
Setting detail: OUTPATIENT SURGERY
Discharge: HOME HEALTH | End: 2020-07-28
Attending: PODIATRIST | Admitting: PODIATRIST
Payer: MEDICARE

## 2020-07-28 VITALS
DIASTOLIC BLOOD PRESSURE: 60 MMHG | HEART RATE: 75 BPM | OXYGEN SATURATION: 99 % | HEIGHT: 66 IN | TEMPERATURE: 97 F | RESPIRATION RATE: 16 BRPM | SYSTOLIC BLOOD PRESSURE: 151 MMHG | BODY MASS INDEX: 34.37 KG/M2 | WEIGHT: 213.84 LBS

## 2020-07-28 PROCEDURE — 2500000003 HC RX 250 WO HCPCS: Performed by: PODIATRIST

## 2020-07-28 PROCEDURE — 7100000010 HC PHASE II RECOVERY - FIRST 15 MIN: Performed by: PODIATRIST

## 2020-07-28 PROCEDURE — 88311 DECALCIFY TISSUE: CPT

## 2020-07-28 PROCEDURE — 87070 CULTURE OTHR SPECIMN AEROBIC: CPT

## 2020-07-28 PROCEDURE — 2709999900 HC NON-CHARGEABLE SUPPLY: Performed by: PODIATRIST

## 2020-07-28 PROCEDURE — 88304 TISSUE EXAM BY PATHOLOGIST: CPT

## 2020-07-28 PROCEDURE — 3600000002 HC SURGERY LEVEL 2 BASE: Performed by: PODIATRIST

## 2020-07-28 PROCEDURE — 3600000012 HC SURGERY LEVEL 2 ADDTL 15MIN: Performed by: PODIATRIST

## 2020-07-28 PROCEDURE — 88305 TISSUE EXAM BY PATHOLOGIST: CPT

## 2020-07-28 PROCEDURE — 87176 TISSUE HOMOGENIZATION CULTR: CPT

## 2020-07-28 PROCEDURE — 86403 PARTICLE AGGLUT ANTBDY SCRN: CPT

## 2020-07-28 PROCEDURE — 7100000011 HC PHASE II RECOVERY - ADDTL 15 MIN: Performed by: PODIATRIST

## 2020-07-28 PROCEDURE — 87205 SMEAR GRAM STAIN: CPT

## 2020-07-28 PROCEDURE — 87075 CULTR BACTERIA EXCEPT BLOOD: CPT

## 2020-07-28 RX ORDER — SODIUM CHLORIDE 0.9 % (FLUSH) 0.9 %
10 SYRINGE (ML) INJECTION EVERY 12 HOURS SCHEDULED
Status: DISCONTINUED | OUTPATIENT
Start: 2020-07-28 | End: 2020-07-28 | Stop reason: HOSPADM

## 2020-07-28 RX ORDER — SODIUM CHLORIDE 0.9 % (FLUSH) 0.9 %
10 SYRINGE (ML) INJECTION PRN
Status: DISCONTINUED | OUTPATIENT
Start: 2020-07-28 | End: 2020-07-28 | Stop reason: HOSPADM

## 2020-07-28 RX ORDER — BUPIVACAINE HYDROCHLORIDE 5 MG/ML
INJECTION, SOLUTION EPIDURAL; INTRACAUDAL PRN
Status: DISCONTINUED | OUTPATIENT
Start: 2020-07-28 | End: 2020-07-28 | Stop reason: ALTCHOICE

## 2020-07-28 RX ORDER — LIDOCAINE HYDROCHLORIDE 10 MG/ML
1 INJECTION, SOLUTION EPIDURAL; INFILTRATION; INTRACAUDAL; PERINEURAL
Status: DISCONTINUED | OUTPATIENT
Start: 2020-07-28 | End: 2020-07-28 | Stop reason: HOSPADM

## 2020-07-28 RX ORDER — DOXYCYCLINE HYCLATE 100 MG
100 TABLET ORAL 2 TIMES DAILY
Qty: 20 TABLET | Refills: 0 | Status: SHIPPED | OUTPATIENT
Start: 2020-07-28 | End: 2020-08-07

## 2020-07-28 RX ORDER — SODIUM CHLORIDE, SODIUM LACTATE, POTASSIUM CHLORIDE, CALCIUM CHLORIDE 600; 310; 30; 20 MG/100ML; MG/100ML; MG/100ML; MG/100ML
INJECTION, SOLUTION INTRAVENOUS CONTINUOUS
Status: DISCONTINUED | OUTPATIENT
Start: 2020-07-28 | End: 2020-07-28

## 2020-07-28 RX ORDER — SODIUM CHLORIDE 9 MG/ML
INJECTION, SOLUTION INTRAVENOUS CONTINUOUS
Status: DISCONTINUED | OUTPATIENT
Start: 2020-07-28 | End: 2020-07-28

## 2020-07-28 ASSESSMENT — PAIN SCALES - GENERAL: PAINLEVEL_OUTOF10: 0

## 2020-07-28 NOTE — OP NOTE
PODIATRY OP NOTE    PATIENT NAME: Amanda Russell  YOB: 1935  -  82 y.o. male  MRN: [id number]  DATE: 7/28/2020  BILLING #: [id number]    Surgeon(s):  Neil Cunha DPM     ASSISTANTS: Cesar Saldana DPM PGY 2    PRE-OP DIAGNOSIS:   1. Chronic ulcer, second digit, right foot  2. DM II with peripheral neuropathy  3. Osteomyelitis, distal phalanx, second toe right foot    POST-OP DIAGNOSIS: Same as above. PROCEDURE:   1. Partial right second toe amputation at the level of the DIPJ    ANESTHESIA: Local    HEMOSTASIS: Tamponade    ESTIMATED BLOOD LOSS: Less than 5 cc. MATERIALS: 4-0 Vicryl, 3-0 nylon  * No implants in log *    INJECTABLES: 7 cc of 1:1 mix of 0.5% marcaine plain and 1% lidocaine plain preoperatively and 6 cc of 0.5% Marcaine plain postoperatively    SPECIMEN:   ID Type Source Tests Collected by Time Destination   A : right foot second toe distal phalanx Tissue Toe SURGICAL PATHOLOGY, CULTURE, TISSUE Neil Cunha DPM 7/28/2020 1452    B : RIGHT DISTAL 2ND TOE Tissue Toe SURGICAL PATHOLOGY Neil Cunha DPM 7/28/2020 5157        COMPLICATIONS: None    FINDINGS:  Bone of the distal phalanx of the second digit of the right foot eroded to the central aspect of the bone. Middle phalanx did not appear to have erosions or other signs of decreased bone quality intraoperatively. The patient was counseled at length about the risks of libby Covid-19 during their perioperative period and any recovery window from their procedure. The patient was made aware that libby Covid-19  may worsen their prognosis for recovering from their procedure  and lend to a higher morbidity and/or mortality risk. All material risks, benefits, and reasonable alternatives including postponing the procedure were discussed. The patient does wish to proceed with the procedure at this time.       INDICATION FOR PROCEDURE:  Patient has a longstanding ulceration to the distal aspect of the second digit of the right foot. XR and MRI of the foot revealed osteomyelitis of the distal phalanx second digit. Patient is in OR today to remove infected bone via distal Syme amputation of the second toe of the right foot. All risks and benefits discussed with the patient in detail. Discussed with patient that he may require longer than normal healing time and the wound may not heal due to PVD and poor glucose control thus resulting in more proximal amputation. Patient elects to proceed with surgical treatment of osteomyelitis at this time. No guarantees were given or implied. Consent signed and in the chart. PROCEDURE IN DETAIL: The patient was transported from pre-op to the operating room and placed on the operating table in the supine position with a safety strap across the lap. Local block of 7 cc of 1% lidocaine plain was injected. The foot was then prepped and draped in the usual aseptic fashion. Attention was directed to the  right 2nd digit. There was wound present to the distal aspect of the toe. A #15 blade was used to create 2 converging semielliptical incisions around the distal aspect of the toe. Distal phalanx was then disarticulated from the remainder of the digit using a pickup and #15 blade. It was noted that nearly the entire distal half of the distal phalanx was eroded away. Distal phalanx was passed to the back table as specimen to be sent to micro for culture and then pathology. Area was then irrigated with sterile saline via bulb irrigation. Incision was then coapted in sequential layers. Marcaine was injected for digital block. Dressings consisting of adaptic, 4 x 4s, Kerlix and an Ace bandage were applied. The patient tolerated the above procedure and anesthesia well without complications. The patient was transported from the operating room to the PACU with vital signs stable and vascular status intact to the right foot.  Patient is instructed to minimize weight bearing in order to allow for healing and prevent dehiscence of the incision. He has a rolling walker at home. He is asked to place weight on the heel only of the foot for transfers.        Electronically signed by Jose Arias DPM on 7/28/2020 at 4:13 PM

## 2020-07-28 NOTE — BRIEF OP NOTE
PODIATRY BRIEF OP NOTE    PATIENT NAME: Ruben Russell  YOB: 1935  -  82 y.o. male  MRN: [id number]  DATE: 7/28/2020  BILLING #: [id number]    Surgeon(s):  Massiel Moreno DPM     ASSISTANTS: Lennox Cancel, DPM PGY 2    PRE-OP DIAGNOSIS:   1. Chronic ulcer, second digit, right foot  2. DM II with peripheral neuropathy  3. Osteomyelitis, distal phalanx, second toe right foot    POST-OP DIAGNOSIS: Same as above. PROCEDURE:   1. Partial right second toe amputation at the level of the DIPJ    ANESTHESIA: Local    HEMOSTASIS: Tamponade    ESTIMATED BLOOD LOSS: Less than 5 cc. MATERIALS: 4-0 Vicryl, 3-0 nylon  * No implants in log *    INJECTABLES: 7 cc of 1:1 mix of 0.5% marcaine plain and 1% lidocaine plain preoperatively and 6 cc of 0.5% Marcaine plain postoperatively    SPECIMEN:   ID Type Source Tests Collected by Time Destination   A : right foot second toe distal phalanx Tissue Toe SURGICAL PATHOLOGY, CULTURE, TISSUE Massiel Moreno DPM 7/28/2020 1452    B : RIGHT DISTAL 2ND TOE Tissue Toe SURGICAL PATHOLOGY Massiel Moreno DPM 7/28/2020 6583        COMPLICATIONS: None    FINDINGS:  Bone of the distal phalanx of the second digit of the right foot eroded to the central aspect of the bone. Middle phalanx did not appear to have erosions or other signs of decreased bone quality intraoperatively. The patient was counseled at length about the risks of libby Covid-19 during their perioperative period and any recovery window from their procedure. The patient was made aware that libby Covid-19  may worsen their prognosis for recovering from their procedure  and lend to a higher morbidity and/or mortality risk. All material risks, benefits, and reasonable alternatives including postponing the procedure were discussed. The patient does wish to proceed with the procedure at this time.     Lennox Cancel, DPM   Podiatric Medicine & Surgery   7/28/2020 at 3:31 PM

## 2020-07-28 NOTE — H&P
History and Physical Update    Pt Name: Noel Alvarez  MRN: [id number]  YOB: 1935  Date of evaluation: 7/28/2020      [x] I have reviewed the Progress Note by Dr Keyonna Jules dated 7/15/20 in epic which meets the criteria for an Interval History and Physical note and is attached below. [x] I have examined  Leo Russell  There are no changes to the patient who is scheduled for a Local right partial 2nd toe amputation by Dr Keyonna Jules DPM for osteomyelitis second toe right foot, nonhealing wound    The patient denies health changes, fever, chills or cough    Hx angioplasty with stent 1998 taking ASA 81mg QD  No recent cardiology visit  Hx JAISON using CAP nightly  Denies increased SOB, wheezing, dyspnea at rest, palpitations, dizziness, syncope or chest pain. Vital signs: BP (!) 145/88   Pulse 50   Temp 97.3 °F (36.3 °C) (Temporal)   Resp 18   Ht 5' 6\" (1.676 m)   Wt 213 lb 13.5 oz (97 kg)   SpO2 96%   BMI 34.52 kg/m²     Allergies:  Codeine and Feldene [piroxicam]    Medications:    Prior to Admission medications    Medication Sig Start Date End Date Taking? Authorizing Provider   ketoconazole (NIZORAL) 2 % cream Apply topically BID to nails and skin of both feet.  7/8/20  Yes Harlen Oppenheim, DPM   levothyroxine (SYNTHROID) 25 MCG tablet Take 25 mcg by mouth Daily   Yes Historical Provider, MD   simvastatin (ZOCOR) 80 MG tablet Take 80 mg by mouth nightly   Yes Historical Provider, MD   potassium bicarbonate (K-LYTE) 25 MEQ disintegrating tablet Take 25 mEq by mouth daily   Yes Historical Provider, MD   allopurinol (ZYLOPRIM) 300 MG tablet Take 300 mg by mouth daily   Yes Historical Provider, MD   Cholecalciferol (VITAMIN D3) 88505 units CAPS Take 1 capsule by mouth once a week TAKES ON WEDS   Yes Historical Provider, MD   pantoprazole (PROTONIX) 40 MG tablet Take 40 mg by mouth nightly   Yes Historical Provider, MD   valsartan (DIOVAN) 80 MG tablet Take 80 mg by mouth nightly   Yes Historical ANGIOPLASTY WITH 1 STENT    CAROTID ENDARTERECTOMY       CHOLECYSTECTOMY       COLONOSCOPY        JOINT REPLACEMENT Left      KNEE    KS RELEAS VITREOUS,SUBRET/CHOROID FLUID Right 2018     VITRECTOMY 25 GAUGE, MEMBRANE PEELING performed by Sergio Workman MD at [address]        CAUTERIZE BLEEDER    VITRECTOMY Right 2018     with membrane stripping           FAMILY HISTORY     Family History         Family History   Problem Relation Age of Onset    Cancer Mother           OVARIAN    Cancer Father           PROSTATE    Heart Disease Father           CAD-OPEN HEART    Other Brother 71         BLOOD CLOT TO BRAIN    Other Brother 43          IN AUTO ACCIDENT           SOCIAL HISTORY     Social History           Tobacco Use    Smoking status: Never Smoker    Smokeless tobacco: Never Used   Substance Use Topics    Alcohol use: No    Drug use: No        ALLERGIES           Allergies   Allergen Reactions    Codeine Other (See Comments)       HALLUCINATIONS    Feldene [Piroxicam] Other (See Comments)       RAISES BLOOD PRESSURE           MEDICATIONS            Current Outpatient Medications on File Prior to Encounter   Medication Sig Dispense Refill    ketoconazole (NIZORAL) 2 % cream Apply topically BID to nails and skin of both feet.  1 Tube 3    levothyroxine (SYNTHROID) 25 MCG tablet Take 25 mcg by mouth Daily        simvastatin (ZOCOR) 80 MG tablet Take 80 mg by mouth nightly        potassium bicarbonate (K-LYTE) 25 MEQ disintegrating tablet Take 25 mEq by mouth daily        allopurinol (ZYLOPRIM) 300 MG tablet Take 300 mg by mouth daily        Cholecalciferol (VITAMIN D3) 32532 units CAPS Take 1 capsule by mouth once a week TAKES ON         aspirin 81 MG tablet Take 81 mg by mouth daily        pantoprazole (PROTONIX) 40 MG tablet Take 40 mg by mouth nightly        valsartan (DIOVAN) 80 MG tablet Take 80 mg by mouth nightly  metoprolol succinate (TOPROL XL) 100 MG extended release tablet Take 100 mg by mouth daily        PARoxetine (PAXIL) 20 MG tablet Take 20 mg by mouth nightly        triamterene-hydrochlorothiazide (MAXZIDE-25) 37.5-25 MG per tablet Take 1 tablet by mouth daily        Multiple Vitamins-Minerals (THERAPEUTIC MULTIVITAMIN-MINERALS) tablet Take 1 tablet by mouth daily          No current facility-administered medications on file prior to encounter. REVIEW OF SYSTEMS     Review of Systems   Constitutional: Negative for chills and fever. Cardiovascular: Negative for leg swelling. Gastrointestinal: Negative for diarrhea, nausea and vomiting. Musculoskeletal: Negative for myalgias. Skin: Positive for wound. Right 2nd toe   Neurological: Positive for numbness. Negative for weakness. Both feet   Hematological: Does not bruise/bleed easily. Psychiatric/Behavioral: The patient is not nervous/anxious. Objective:       /64   Pulse 57   Temp 98.2 °F (36.8 °C) (Oral)   Resp 17   Ht 5' 6\" (1.676 m)   Wt 223 lb (101.2 kg)   BMI 35.99 kg/m²          Wt Readings from Last 3 Encounters:   07/15/20 223 lb (101.2 kg)   02/08/18 223 lb 5.2 oz (101.3 kg)        Physical Exam:  General:  Alert and oriented x3. In no acute distress. Lower Extremity Physical Exam:     Vascular: DP pulses are not palpable, Bilateral. PT pulses are not palpable, Bilateral. CFT <3 seconds to all digits, Bilateral.  No edema, Bilateral.  Hair growth is absent to the level of the digits, Bilateral.  Localized edema present to right second digit. Neuro: Saph/sural/SP/DP/plantar sensation diminished to light touch. Musculoskeletal: EHL/FHL/GS/TA gross motor intact. Gross deformity is present hammertoe second digit right. Dermatologic: Open wound present to distal right second toe as documented in detail below. Wound extends to the level of subcutaneous tissue.   Periwound skin is mildly hyperkeratotic. Wound does not probe to bone. No erythema. No purulent drainage. No increased calor. No fluctuance, crepitus. Interdigital maceration absent, Bilateral.  Nails 1-10 are thickened, elongated, dystrophic, subungual debris. XR Right Foot 07/08/20  Impression    1. Soft tissue swelling in the right 2nd toe potentially due to cellulitis    with no definite visualization of the reported ulceration. There is    suspicion for osteomyelitis involving the distal tuft of the underlying right    2nd distal phalanx, but no findings of necrotizing fasciitis are identified. Consider MRI. 2. Mild osteoarthritic changes of the right talonavicular and 1st    metatarsophalangeal joints. Assessment:         Active Hospital Problems     Diagnosis Date Noted    Osteomyelitis of second toe of right foot ([address] [M86.9] 07/15/2020    Neuropathic ulcer of toe of right foot with fat layer exposed ([address] [L97.512] 07/08/2020    Hammertoe of second toe of right foot [M20.41] 07/08/2020    Idiopathic neuropathy [G60.9] 07/08/2020    Tinea pedis of both feet [B35.3] 07/08/2020        Plan:      Treatment Note please see attached Discharge Instructions     Instructed to  ketoconazole 2% cream to be applied to nails and skin of feet twice daily for 14 days or until resolved. Directed not to put cream into the wound. Fibracol dressing and a dry sterile dressing to ulceration of right second toe daily. Surgical shoe with offloading cut out in the insert in the bottom of the shoe. Was instructed to minimize ambulation and use the heel during ambulation. He is educated on the etiology of this wound including neuropathy and chronic pressure. Will plan for MRI of the right foot to determine the extent of the infection and level of possible amputation.       PVR/PPG as we are not able to obtain from previous hospital and patient is unsure whether this was actually done or not Discussed amputation of the distal aspect of the right 2nd toe vs amputation of the entire toe pending the result of the MRI in order to treat infection. Discussed risks involved with this surgery including non-healing surgical site and risk for more proximal amputation or loss of limb. Procedure Note  Indications:  Based on my examination of this patient's wound(s)/ulcer(s) today, debridement is required to promote healing and evaluate the wound base. Performed by: Korin Ramirez DPM     Consent obtained:  Yes     Time out taken:  Yes     Pain Control: Anesthetic  Anesthetic: 2% Lidocaine Gel Topical         Debridement: Excisional Debridement     Using curette and #15 blade scalpel the wound(s)/ulcer(s) was/were sharply debrided down through and including the removal of subcutaneous tissue. Devitalized Tissue Debrided:  fibrin, biofilm, necrotic/eschar and callus     Pre Debridement Measurements:  Are located in the [place]  Documentation Flow Sheet     Wound/Ulcer #: 1     Post Debridement Measurements:  Wound/Ulcer Descriptions are Pre Debridement except measurements:          Wound 07/08/20 Toe (Comment  which one) Right #1 (Active)   Wound Image   07/08/20 0930   Wound Other 07/15/20 1047   Dressing Status New drainage; Old drainage 07/15/20 1047   Dressing Changed Changed/New 07/15/20 1047   Dressing/Treatment Other (comment) 07/08/20 1023   Wound Cleansed Rinsed/Irrigated with saline 07/15/20 1047   Wound Length (cm) 0.4 cm 07/15/20 1047   Wound Width (cm) 0.4 cm 07/15/20 1047   Wound Depth (cm) 0.2 cm 07/15/20 1047   Wound Surface Area (cm^2) 0.16 cm^2 07/15/20 1047   Change in Wound Size % (l*w) 54.29 07/15/20 1047   Wound Volume (cm^3) 0.03 cm^3 07/15/20 1047   Wound Healing % 79 07/15/20 1047   Post-Procedure Length (cm) 0.4 cm 07/15/20 1047   Post-Procedure Width (cm) 0.4 cm 07/15/20 1047   Post-Procedure Depth (cm) 0.2 cm 07/15/20 1047   Post-Procedure Surface Area (cm^2) 0.16 cm^2 07/15/20 1047   Post-Procedure Volume (cm^3) 0.03 cm^3 07/15/20 1047   Wound Assessment Drainage;[place] 07/15/20 1047   Drainage Amount Moderate 07/15/20 1047   Drainage Description Sanguinous 07/15/20 1047   Odor None 07/15/20 1047   Margins Defined edges 07/15/20 1047   Julieta-wound Assessment Clean;Dry; Intact 07/08/20 0930   Pink%Wound Bed 0 07/15/20 1047   Red%Wound Bed 60 07/15/20 1047   Yellow%Wound Bed 40 07/15/20 1047   Number of days: 7           Percent of Wound(s)/Ulcer(s) Debrided: 100%     Total Surface Area Debrided:  0.16 sq cm      Diabetic/Pressure/Non Pressure Ulcers only:  Ulcer: Other: neuropathic ulcer      Estimated Blood Loss:  Minimal     Hemostasis Achieved:  by pressure     Procedural Pain:  0  / 10      Post Procedural Pain:  0 / 10      Response to treatment:  Well tolerated by patient. Written patient discharge instructions given to patient and signed by patient or POA. Discharge Instructions           [facility] -Phone: [phone] Fax: [phone]             Visit  Discharge Instructions / Physician Orders     DATE: 7/15/2020     Home Care: NA     SUPPLIES ORDERED THRU: NA     Wound Location:  Right Second Toe     Cleanse with: Liquid antibacterial soap and water, rinse well      Dressing Orders: Promogran, Bandaid     Frequency: Daily     Additional Orders: Increase protein to diet (meat, cheese, eggs, fish, peanut butter, nuts and beans)  Multivitamin daily  ELEVATE LEGS AS MUCH AS POSSIBLE  Get MRI of toe  Get vascular studies performed     Your next appointment with [address] [place] [address] [facility] is in 1 week     ROOM TYPE   [x]? ? CHAIR     []? ? STRETCHER  []?? EITHER             (Please note your next appointment above and if you are unable to keep, kindly give a 24 hour notice.  Thank you.)     If you experience any of the following, please call the [facility] during business hours:  [phone]     * Increase in Pain  * Temperature over 101  *

## 2020-07-30 LAB — SURGICAL PATHOLOGY REPORT: NORMAL

## 2020-08-02 LAB
CULTURE: ABNORMAL
CULTURE: ABNORMAL
DIRECT EXAM: ABNORMAL
DIRECT EXAM: ABNORMAL
Lab: ABNORMAL
SPECIMEN DESCRIPTION: ABNORMAL

## 2020-08-05 ENCOUNTER — HOSPITAL ENCOUNTER (OUTPATIENT)
Dept: WOUND CARE | Age: 85
Discharge: HOME | End: 2020-08-05
Payer: MEDICARE

## 2020-08-05 PROBLEM — Z89.421 HISTORY OF AMPUTATION OF LESSER TOE OF RIGHT FOOT (HCC): Status: ACTIVE | Noted: 2020-08-05

## 2020-08-05 PROCEDURE — 11721 DEBRIDE NAIL 6 OR MORE: CPT

## 2020-08-05 PROCEDURE — 6370000000 HC RX 637 (ALT 250 FOR IP): Performed by: PODIATRIST

## 2020-08-05 RX ORDER — GINSENG 100 MG
CAPSULE ORAL ONCE
Status: CANCELLED | OUTPATIENT
Start: 2020-08-05

## 2020-08-05 RX ORDER — GENTAMICIN SULFATE 1 MG/G
OINTMENT TOPICAL ONCE
Status: CANCELLED | OUTPATIENT
Start: 2020-08-12

## 2020-08-05 RX ORDER — LIDOCAINE 40 MG/G
CREAM TOPICAL ONCE
Status: CANCELLED | OUTPATIENT
Start: 2020-08-05

## 2020-08-05 RX ORDER — LIDOCAINE 40 MG/G
CREAM TOPICAL ONCE
Status: CANCELLED | OUTPATIENT
Start: 2020-08-12

## 2020-08-05 RX ORDER — BACITRACIN ZINC AND POLYMYXIN B SULFATE 500; 1000 [USP'U]/G; [USP'U]/G
OINTMENT TOPICAL ONCE
Status: CANCELLED | OUTPATIENT
Start: 2020-08-12

## 2020-08-05 RX ORDER — CLOBETASOL PROPIONATE 0.5 MG/G
OINTMENT TOPICAL ONCE
Status: CANCELLED | OUTPATIENT
Start: 2020-08-12

## 2020-08-05 RX ORDER — LIDOCAINE HYDROCHLORIDE 20 MG/ML
JELLY TOPICAL ONCE
Status: CANCELLED
Start: 2020-08-12

## 2020-08-05 RX ORDER — CLOBETASOL PROPIONATE 0.5 MG/G
OINTMENT TOPICAL ONCE
Status: CANCELLED | OUTPATIENT
Start: 2020-08-05

## 2020-08-05 RX ORDER — BETAMETHASONE DIPROPIONATE 0.05 %
OINTMENT (GRAM) TOPICAL ONCE
Status: CANCELLED | OUTPATIENT
Start: 2020-08-12

## 2020-08-05 RX ORDER — LIDOCAINE 50 MG/G
OINTMENT TOPICAL ONCE
Status: CANCELLED | OUTPATIENT
Start: 2020-08-05

## 2020-08-05 RX ORDER — BACITRACIN ZINC AND POLYMYXIN B SULFATE 500; 1000 [USP'U]/G; [USP'U]/G
OINTMENT TOPICAL ONCE
Status: CANCELLED | OUTPATIENT
Start: 2020-08-05

## 2020-08-05 RX ORDER — LIDOCAINE HYDROCHLORIDE 40 MG/ML
SOLUTION TOPICAL ONCE
Status: CANCELLED | OUTPATIENT
Start: 2020-08-12

## 2020-08-05 RX ORDER — GINSENG 100 MG
CAPSULE ORAL ONCE
Status: CANCELLED | OUTPATIENT
Start: 2020-08-12

## 2020-08-05 RX ORDER — LIDOCAINE HYDROCHLORIDE 40 MG/ML
SOLUTION TOPICAL ONCE
Status: CANCELLED | OUTPATIENT
Start: 2020-08-05

## 2020-08-05 RX ORDER — LIDOCAINE 50 MG/G
OINTMENT TOPICAL ONCE
Status: CANCELLED | OUTPATIENT
Start: 2020-08-12

## 2020-08-05 RX ORDER — BACITRACIN, NEOMYCIN, POLYMYXIN B 400; 3.5; 5 [USP'U]/G; MG/G; [USP'U]/G
OINTMENT TOPICAL ONCE
Status: CANCELLED | OUTPATIENT
Start: 2020-08-12

## 2020-08-05 RX ORDER — BETAMETHASONE DIPROPIONATE 0.05 %
OINTMENT (GRAM) TOPICAL ONCE
Status: CANCELLED | OUTPATIENT
Start: 2020-08-05

## 2020-08-05 RX ORDER — LIDOCAINE HYDROCHLORIDE 20 MG/ML
JELLY TOPICAL ONCE
Status: COMPLETED | OUTPATIENT
Start: 2020-08-05 | End: 2020-08-05

## 2020-08-05 RX ORDER — GENTAMICIN SULFATE 1 MG/G
OINTMENT TOPICAL ONCE
Status: CANCELLED | OUTPATIENT
Start: 2020-08-05

## 2020-08-05 RX ORDER — BACITRACIN, NEOMYCIN, POLYMYXIN B 400; 3.5; 5 [USP'U]/G; MG/G; [USP'U]/G
OINTMENT TOPICAL ONCE
Status: CANCELLED | OUTPATIENT
Start: 2020-08-05

## 2020-08-05 RX ADMIN — LIDOCAINE HYDROCHLORIDE: 20 JELLY TOPICAL at 08:55

## 2020-08-05 ASSESSMENT — ENCOUNTER SYMPTOMS
NAUSEA: 0
SHORTNESS OF BREATH: 0
VOMITING: 0
DIARRHEA: 0

## 2020-08-05 NOTE — PROGRESS NOTES
[address]   Progress Note and Procedure Note      Ramon Yu  MEDICAL RECORD NUMBER:  [id number]  AGE: 82 y.o. GENDER: male  : 1935  EPISODE DATE:  2020    Subjective:     Chief Complaint   Patient presents with    Wound Check         HISTORY of PRESENT ILLNESS HPI     Ramon Yu is a 82 y.o. male who presents today for wound/ulcer evaluation. Interval History:  Patient presents s/p partial amputation of the right second toe (DOS: 20). States that he has done okay, has difficulty maintaining nonweightbearing. Has been using the cream on the left foot and notes improvement. History of Wound Context: Patient presents with family with ulceration of the right second toe. Patient states that the ulcer has been present for about 1 year now. Denies being diabetic but has neuropathy in both feet. States that it has been swollen and infected multiple times in the past.  States that he has been ambulating in sandals.   Wound/Ulcer Pain Timing/Severity: none  Quality of pain: N/A  Severity:  0 / 10   Modifying Factors: None  Associated Signs/Symptoms: none    Ulcer Identification:  Ulcer Type: arterial, pressure and neuropathic  Contributing Factors: chronic pressure, shear force, arterial insufficiency and decreased tissue oxygenation          PAST MEDICAL HISTORY        Diagnosis Date    Arthritis     KNEES    CAD (coronary artery disease)     ANGIOPLASTY WITH 1 STENT    GERD (gastroesophageal reflux disease)     HX OF BLEEDING ULCER    Gout     ON RX    Elem (hard of hearing)     WEARS BILAT HEARING AIDS    Hyperlipidemia     ON RX    Hypertension     ON RX    Kidney stone     PASSED ON OWN    PVC (premature ventricular contraction) 10/2014    ON RX BETTER NOW    Sleep apnea     C-PAP    Thyroid disease 2017    HYPOTHYROIDISM-ON RX    Vision abnormalities 2016    DISTORTED RIGHT EYE    Wears dentures     UPPER FULL PLATE, LOWER tablet Take 25 mEq by mouth daily      allopurinol (ZYLOPRIM) 300 MG tablet Take 300 mg by mouth daily      Cholecalciferol (VITAMIN D3) 85866 units CAPS Take 1 capsule by mouth once a week TAKES ON WEDS      aspirin 81 MG tablet Take 81 mg by mouth daily      pantoprazole (PROTONIX) 40 MG tablet Take 40 mg by mouth nightly      valsartan (DIOVAN) 80 MG tablet Take 80 mg by mouth nightly      metoprolol succinate (TOPROL XL) 100 MG extended release tablet Take 100 mg by mouth daily      PARoxetine (PAXIL) 20 MG tablet Take 20 mg by mouth nightly      triamterene-hydrochlorothiazide (MAXZIDE-25) 37.5-25 MG per tablet Take 1 tablet by mouth daily      Multiple Vitamins-Minerals (THERAPEUTIC MULTIVITAMIN-MINERALS) tablet Take 1 tablet by mouth daily       No current facility-administered medications on file prior to encounter. REVIEW OF SYSTEMS    Review of Systems   Constitutional: Negative for chills and fever. Respiratory: Negative for shortness of breath. Cardiovascular: Negative for leg swelling. Gastrointestinal: Negative for diarrhea, nausea and vomiting. Musculoskeletal: Negative for myalgias. No calf pain. Skin: Negative for wound. Incision on right second toe   Neurological: Positive for numbness. Negative for weakness. Both feet   Hematological: Does not bruise/bleed easily. Psychiatric/Behavioral: The patient is not nervous/anxious. Objective: There were no vitals taken for this visit. Wt Readings from Last 3 Encounters:   07/28/20 213 lb 13.5 oz (97 kg)   07/24/20 213 lb 13.5 oz (97 kg)   07/15/20 223 lb (101.2 kg)       Physical Exam:  General:  Alert and oriented x3. In no acute distress.      Lower Extremity Physical Exam:    Vascular: DP pulses are not palpable, Bilateral. PT pulses are not palpable, Bilateral. CFT <3 seconds to all digits, Bilateral.  No edema, Bilateral.  Hair growth is absent to the level of the digits, Bilateral. Localized edema present to right second digit. Neuro: Saph/sural/SP/DP/plantar sensation diminished to light touch. Musculoskeletal: EHL/FHL/GS/TA gross motor intact. Gross deformity is present hammertoe second digit right. Dermatologic: Surgical incision with sutures intact to right second toe. There is no sign of dehiscence. Incision is healing well. No erythema. No purulent drainage. No increased calor. No fluctuance, crepitus. Interdigital maceration absent, Bilateral.  Nails 1-5 left and 1,3,4,5 right are thickened, elongated, yellow dystrophic, subungual debris. Left hallux nail mildly ingrowing at lateral corner with no sign of infection. Assessment:        Active Hospital Problems    Diagnosis Date Noted    History of amputation of lesser toe of right foot ([address] [Z89.421] 08/05/2020    Osteomyelitis of second toe of right foot ([address] [M86.9] 07/15/2020    Hammertoe of second toe of right foot [M20.41] 07/08/2020    Idiopathic neuropathy [G60.9] 07/08/2020    Tinea pedis of both feet [B35.3] 07/08/2020    Onychomycosis [B35.1] 07/08/2020       Plan:     Treatment Note please see attached Discharge Instructions    Incision painted with betadine and covered with DSD and light ACE wrap to ankle. Nails 1-5 left and 1,3,4,5 right were were debrided in length and thickness using sterile nail nippers and a dremel. Continue ketoconazole 2% cream to be applied to nails and skin of left foot twice daily until resolved. Apply to right foot once incision heals. Continue surgical shoe to offload surgical site and prevent dehiscence. He is asked to minimize WB and bear weight only to the heel of the right foot. Written patient discharge instructions given to patient and signed by patient or POA.          Discharge Instructions          [address]: [phone] Fax: [phone]             Visit [facility] Instructions / Physician Orders     DATE:

## 2020-08-12 ENCOUNTER — HOSPITAL ENCOUNTER (OUTPATIENT)
Dept: WOUND CARE | Age: 85
Discharge: HOME | End: 2020-08-12
Payer: MEDICARE

## 2020-08-12 VITALS
BODY MASS INDEX: 34.23 KG/M2 | TEMPERATURE: 98.2 F | HEIGHT: 66 IN | DIASTOLIC BLOOD PRESSURE: 83 MMHG | SYSTOLIC BLOOD PRESSURE: 170 MMHG | WEIGHT: 213 LBS | HEART RATE: 60 BPM

## 2020-08-12 PROCEDURE — 6370000000 HC RX 637 (ALT 250 FOR IP): Performed by: PODIATRIST

## 2020-08-12 PROCEDURE — 99212 OFFICE O/P EST SF 10 MIN: CPT

## 2020-08-12 RX ORDER — LIDOCAINE HYDROCHLORIDE 20 MG/ML
JELLY TOPICAL ONCE
Status: CANCELLED
Start: 2020-08-19

## 2020-08-12 RX ORDER — BACITRACIN, NEOMYCIN, POLYMYXIN B 400; 3.5; 5 [USP'U]/G; MG/G; [USP'U]/G
OINTMENT TOPICAL ONCE
Status: CANCELLED | OUTPATIENT
Start: 2020-08-19

## 2020-08-12 RX ORDER — BETAMETHASONE DIPROPIONATE 0.05 %
OINTMENT (GRAM) TOPICAL ONCE
Status: CANCELLED | OUTPATIENT
Start: 2020-08-19

## 2020-08-12 RX ORDER — CLOBETASOL PROPIONATE 0.5 MG/G
OINTMENT TOPICAL ONCE
Status: CANCELLED | OUTPATIENT
Start: 2020-08-19

## 2020-08-12 RX ORDER — LIDOCAINE HYDROCHLORIDE 40 MG/ML
SOLUTION TOPICAL ONCE
Status: CANCELLED | OUTPATIENT
Start: 2020-08-19

## 2020-08-12 RX ORDER — BACITRACIN ZINC AND POLYMYXIN B SULFATE 500; 1000 [USP'U]/G; [USP'U]/G
OINTMENT TOPICAL ONCE
Status: CANCELLED | OUTPATIENT
Start: 2020-08-19

## 2020-08-12 RX ORDER — LIDOCAINE 50 MG/G
OINTMENT TOPICAL ONCE
Status: CANCELLED | OUTPATIENT
Start: 2020-08-19

## 2020-08-12 RX ORDER — GENTAMICIN SULFATE 1 MG/G
OINTMENT TOPICAL ONCE
Status: CANCELLED | OUTPATIENT
Start: 2020-08-19

## 2020-08-12 RX ORDER — LIDOCAINE HYDROCHLORIDE 20 MG/ML
JELLY TOPICAL ONCE
Status: COMPLETED | OUTPATIENT
Start: 2020-08-12 | End: 2020-08-12

## 2020-08-12 RX ORDER — GINSENG 100 MG
CAPSULE ORAL ONCE
Status: CANCELLED | OUTPATIENT
Start: 2020-08-19

## 2020-08-12 RX ORDER — LIDOCAINE 40 MG/G
CREAM TOPICAL ONCE
Status: CANCELLED | OUTPATIENT
Start: 2020-08-19

## 2020-08-12 RX ADMIN — LIDOCAINE HYDROCHLORIDE: 20 JELLY TOPICAL at 09:09

## 2020-08-12 ASSESSMENT — ENCOUNTER SYMPTOMS
SHORTNESS OF BREATH: 0
NAUSEA: 0
VOMITING: 0
DIARRHEA: 0

## 2020-08-12 ASSESSMENT — PAIN SCALES - GENERAL: PAINLEVEL_OUTOF10: 0

## 2020-08-12 NOTE — PROGRESS NOTES
[address]   Progress Note and Procedure Note      Jeremy Vázquez  MEDICAL RECORD NUMBER:  [id number]  AGE: 82 y.o. GENDER: male  : 1935  EPISODE DATE:  2020    Subjective:     Chief Complaint   Patient presents with    Wound Check         HISTORY of PRESENT ILLNESS HPI     Jeremy Vázquez is a 82 y.o. male who presents today for wound/ulcer evaluation. Interval History:  Patient presents s/p partial amputation of the right second toe (DOS: 20). States that he has done okay, has difficulty maintaining nonweightbearing. States that he has not had any pain. History of Wound Context: Patient presents with family with ulceration of the right second toe. Patient states that the ulcer has been present for about 1 year now. Denies being diabetic but has neuropathy in both feet. States that it has been swollen and infected multiple times in the past.  States that he has been ambulating in sandals.     Ulcer Identification:  Ulcer Type: arterial, pressure and neuropathic  Contributing Factors: chronic pressure, shear force, arterial insufficiency and decreased tissue oxygenation          PAST MEDICAL HISTORY        Diagnosis Date    Arthritis     KNEES    CAD (coronary artery disease)     ANGIOPLASTY WITH 1 STENT    GERD (gastroesophageal reflux disease)     HX OF BLEEDING ULCER    Gout     ON RX    [place] (hard of hearing)     WEARS BILAT HEARING AIDS    Hyperlipidemia     ON RX    Hypertension     ON RX    Kidney stone     PASSED ON OWN    PVC (premature ventricular contraction) 10/2014    ON RX BETTER NOW    Sleep apnea     C-PAP    Thyroid disease 2017    HYPOTHYROIDISM-ON RX    Vision abnormalities 2016    DISTORTED RIGHT EYE    Wears dentures     UPPER FULL PLATE, LOWER PARTIAL DOES NOT WEAR LOWER    Wears glasses        PAST SURGICAL HISTORY    Past Surgical History:   Procedure Laterality Date    ABDOMEN SURGERY  2015    SURGERY TO DISLODGE GALL STONES FROM PANCREAS    APPENDECTOMY  2003    CARDIAC SURGERY  1998    ANGIOPLASTY WITH 1 STENT    CAROTID ENDARTERECTOMY Left 2006    CHOLECYSTECTOMY  2015    COLONOSCOPY      JOINT REPLACEMENT Left     KNEE    FL RELEAS VITREOUS,SUBRET/CHOROID FLUID Right 2018    VITRECTOMY 25 GAUGE, MEMBRANE PEELING performed by Omega De Leon MD at [place] [address] Right 2020    RIGHT PARTIAL 2ND TOE AMPUTATION performed by Tana Boston DPM at [address]      CAUTERIZE BLEEDER    VITRECTOMY Right 2018    with membrane stripping       FAMILY HISTORY    Family History   Problem Relation Age of Onset    Cancer Mother         OVARIAN    Cancer Father         PROSTATE    Heart Disease Father         CAD-OPEN HEART    Other Brother 71        BLOOD CLOT TO BRAIN    Other Brother 43         IN AUTO ACCIDENT       SOCIAL HISTORY    Social History     Tobacco Use    Smoking status: Never Smoker    Smokeless tobacco: Never Used   Substance Use Topics    Alcohol use: No    Drug use: No       ALLERGIES    Allergies   Allergen Reactions    Codeine Other (See Comments)     HALLUCINATIONS    Feldene [Piroxicam] Other (See Comments)     RAISES BLOOD PRESSURE         MEDICATIONS    Current Outpatient Medications on File Prior to Encounter   Medication Sig Dispense Refill    ketoconazole (NIZORAL) 2 % cream Apply topically BID to nails and skin of both feet.  1 Tube 3    levothyroxine (SYNTHROID) 25 MCG tablet Take 25 mcg by mouth Daily      simvastatin (ZOCOR) 80 MG tablet Take 80 mg by mouth nightly      potassium bicarbonate (K-LYTE) 25 MEQ disintegrating tablet Take 25 mEq by mouth daily      allopurinol (ZYLOPRIM) 300 MG tablet Take 300 mg by mouth daily      Cholecalciferol (VITAMIN D3) 17945 units CAPS Take 1 capsule by mouth once a week TAKES ON       aspirin 81 MG tablet Take 81 mg by mouth daily      pantoprazole (PROTONIX) 40 MG tablet Take 40 mg by mouth nightly      valsartan (DIOVAN) 80 MG tablet Take 80 mg by mouth nightly      metoprolol succinate (TOPROL XL) 100 MG extended release tablet Take 100 mg by mouth daily      PARoxetine (PAXIL) 20 MG tablet Take 20 mg by mouth nightly      triamterene-hydrochlorothiazide (MAXZIDE-25) 37.5-25 MG per tablet Take 1 tablet by mouth daily      Multiple Vitamins-Minerals (THERAPEUTIC MULTIVITAMIN-MINERALS) tablet Take 1 tablet by mouth daily       No current facility-administered medications on file prior to encounter. REVIEW OF SYSTEMS    Review of Systems   Constitutional: Negative for chills and fever. Respiratory: Negative for shortness of breath. Cardiovascular: Negative for leg swelling. Gastrointestinal: Negative for diarrhea, nausea and vomiting. Musculoskeletal: Negative for myalgias. No calf pain. Skin: Negative for wound. Incision on right second toe   Neurological: Positive for numbness. Negative for weakness. Both feet   Hematological: Does not bruise/bleed easily. Psychiatric/Behavioral: The patient is not nervous/anxious. Objective:      BP (!) 170/83   Pulse 60   Temp 98.2 °F (36.8 °C) (Oral)   Ht 5' 6\" (1.676 m)   Wt 213 lb (96.6 kg)   BMI 34.38 kg/m²     Wt Readings from Last 3 Encounters:   08/12/20 213 lb (96.6 kg)   07/28/20 213 lb 13.5 oz (97 kg)   07/24/20 213 lb 13.5 oz (97 kg)       Physical Exam:  General:  Alert and oriented x3. In no acute distress. Lower Extremity Physical Exam:    Vascular: DP pulses are not palpable, Bilateral. PT pulses are not palpable, Bilateral. CFT <3 seconds to all digits, Bilateral.  No edema, Bilateral.  Hair growth is absent to the level of the digits, Bilateral.  Localized edema present to right second digit. Neuro: Saph/sural/SP/DP/plantar sensation diminished to light touch. Musculoskeletal: EHL/FHL/GS/TA gross motor intact.  Gross deformity is present hammertoe second digit right. Dermatologic: Surgical incision well-coapted to right second toe. Two sutures pulled through plantar skin. No erythema. No purulent drainage. No increased calor. No fluctuance, crepitus. Interdigital maceration absent, Bilateral.  Nails 1-5 left and 1,3,4,5 right are thickened, elongated, yellow dystrophic, subungual debris. 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 Continue ketoconazole 2% cream to be applied to nails and skin of left foot twice daily until resolved. Apply to right foot once incision heals. Continue surgical shoe to offload surgical site and prevent dehiscence. He is asked to minimize WB and bear weight only to the heel of the right foot. Keep toe clean. Instructed on daily dressing changes, betadine paint and DSD. Written patient discharge instructions given to patient and signed by patient or POA. Discharge Instructions           [facility] -Phone: [phone] Fax: [phone]             Visit Yogi Instructions / Physician Orders     DATE: 8/12/2020     Home Care: NA     SUPPLIES ORDERED THRU: NA     Wound Location:  Right Second Toe     Cleanse with: Keep Dry and Intact     Dressing Orders: Paint with betadine, dry gauze, ACE     Frequency: Change as needed     Additional Orders: Increase protein to diet (meat, cheese, eggs, fish, peanut butter, nuts and beans)  Multivitamin daily  ELEVATE LEGS AS MUCH AS POSSIBLE     [facility] appointment with [facility] is in 1 week     ROOM TYPE   [x]? ??? CHAIR     []???? STRETCHER  []???? EITHER             (Please note your next appointment above and if you are unable to keep, kindly give a 24 hour notice. Thank you.)     If you experience any of the following, please call the [facility] during business hours:  [phone]     * Increase in Pain  * Temperature over 101  * Increase in drainage from your wound  * Drainage with a foul odor  * Bleeding  * Increase in swelling  * Need for compression bandage changes due to slippage, breakthrough drainage.     If you need medical attention outside of the business hours of the [facility] please contact your PCP or go to the nearest emergency room.     The information contained in the After Visit Summary has been reviewed with me, the patient and/or responsible adult, by my health care provider(s). I had the opportunity to ask questions regarding this information. I have elected to receive;      []?? ?? After Visit Summary  [x]????Comprehensive Discharge Instruction        Patient signature______________________________________Date:________  Electronically signed by Muriel Babinski, RN on 8/12/2020 at 9:09 AM  Electronically signed by Julian Quick DPM on 8/12/2020 at 9:05 AM          Electronically signed by Julian Quick DPM on 8/12/2020 at 9:19 AM

## 2020-08-19 ENCOUNTER — HOSPITAL ENCOUNTER (OUTPATIENT)
Dept: WOUND CARE | Age: 85
Discharge: HOME | End: 2020-08-19

## 2020-08-24 ENCOUNTER — HOSPITAL ENCOUNTER (OUTPATIENT)
Dept: VASCULAR LAB | Age: 85
Discharge: HOME | End: 2020-08-24
Payer: MEDICARE

## 2020-08-24 PROCEDURE — 93923 UPR/LXTR ART STDY 3+ LVLS: CPT

## 2020-08-26 ENCOUNTER — HOSPITAL ENCOUNTER (OUTPATIENT)
Dept: WOUND CARE | Age: 85
Discharge: HOME | End: 2020-08-26
Payer: MEDICARE

## 2020-08-26 VITALS
WEIGHT: 213 LBS | HEART RATE: 61 BPM | SYSTOLIC BLOOD PRESSURE: 129 MMHG | HEIGHT: 66 IN | TEMPERATURE: 97.7 F | DIASTOLIC BLOOD PRESSURE: 65 MMHG | RESPIRATION RATE: 16 BRPM | BODY MASS INDEX: 34.23 KG/M2

## 2020-08-26 PROBLEM — T81.31XA SURGICAL WOUND DEHISCENCE: Status: ACTIVE | Noted: 2020-08-26

## 2020-08-26 PROBLEM — L97.515 NON-PRESSURE CHRONIC ULCER OF OTHER PART OF RIGHT FOOT WITH MUSCLE INVOLVEMENT WITHOUT EVIDENCE OF NECROSIS (HCC): Status: ACTIVE | Noted: 2020-08-26

## 2020-08-26 PROCEDURE — 11043 DBRDMT MUSC&/FSCA 1ST 20/<: CPT

## 2020-08-26 RX ORDER — LIDOCAINE 50 MG/G
OINTMENT TOPICAL ONCE
Status: CANCELLED | OUTPATIENT
Start: 2020-09-02

## 2020-08-26 RX ORDER — GINSENG 100 MG
CAPSULE ORAL ONCE
Status: CANCELLED | OUTPATIENT
Start: 2020-09-02

## 2020-08-26 RX ORDER — CLOBETASOL PROPIONATE 0.5 MG/G
OINTMENT TOPICAL ONCE
Status: CANCELLED | OUTPATIENT
Start: 2020-09-02

## 2020-08-26 RX ORDER — LIDOCAINE HYDROCHLORIDE 20 MG/ML
JELLY TOPICAL ONCE
Status: COMPLETED | OUTPATIENT
Start: 2020-08-26 | End: 2020-08-26

## 2020-08-26 RX ORDER — BACITRACIN, NEOMYCIN, POLYMYXIN B 400; 3.5; 5 [USP'U]/G; MG/G; [USP'U]/G
OINTMENT TOPICAL ONCE
Status: CANCELLED | OUTPATIENT
Start: 2020-09-02

## 2020-08-26 RX ORDER — BACITRACIN ZINC AND POLYMYXIN B SULFATE 500; 1000 [USP'U]/G; [USP'U]/G
OINTMENT TOPICAL ONCE
Status: CANCELLED | OUTPATIENT
Start: 2020-09-02

## 2020-08-26 RX ORDER — LIDOCAINE HYDROCHLORIDE 40 MG/ML
SOLUTION TOPICAL ONCE
Status: CANCELLED | OUTPATIENT
Start: 2020-09-02

## 2020-08-26 RX ORDER — BETAMETHASONE DIPROPIONATE 0.05 %
OINTMENT (GRAM) TOPICAL ONCE
Status: CANCELLED | OUTPATIENT
Start: 2020-09-02

## 2020-08-26 RX ORDER — LIDOCAINE HYDROCHLORIDE 20 MG/ML
JELLY TOPICAL ONCE
Status: CANCELLED
Start: 2020-09-02

## 2020-08-26 RX ORDER — LIDOCAINE 40 MG/G
CREAM TOPICAL ONCE
Status: CANCELLED | OUTPATIENT
Start: 2020-09-02

## 2020-08-26 RX ORDER — GENTAMICIN SULFATE 1 MG/G
OINTMENT TOPICAL ONCE
Status: CANCELLED | OUTPATIENT
Start: 2020-09-02

## 2020-08-26 RX ADMIN — LIDOCAINE HYDROCHLORIDE: 20 JELLY TOPICAL at 08:56

## 2020-08-26 ASSESSMENT — PAIN SCALES - GENERAL: PAINLEVEL_OUTOF10: 0

## 2020-08-26 ASSESSMENT — ENCOUNTER SYMPTOMS
NAUSEA: 0
DIARRHEA: 0
ROS SKIN COMMENTS: TOE WOUND
VOMITING: 0

## 2020-08-26 ASSESSMENT — PAIN DESCRIPTION - PAIN TYPE: TYPE: CHRONIC PAIN

## 2020-08-26 NOTE — PROGRESS NOTES
[address]   Progress Note and Procedure Note      Daquan Urbano  MEDICAL RECORD NUMBER:  [id number]  AGE: 82 y.o. GENDER: male  : 1935  EPISODE DATE:  2020    Subjective:     Chief Complaint   Patient presents with    Wound Check         HISTORY of PRESENT ILLNESS HPI     Daquan Urbano is a 82 y.o. male who presents today for wound/ulcer evaluation. Interval History:  Patient presents s/p partial amputation of the right second toe (DOS: 20). States that he has been walking in slippers since the last visit. Has been showering and changing bandage. States he stubbed his toe and it did bleed last week. They were nain to stop the bleeding and he hasn't had any problem since. History of Wound Context: Patient presents with family with ulceration of the right second toe. Patient states that the ulcer has been present for about 1 year now. Denies being diabetic but has neuropathy in both feet. States that it has been swollen and infected multiple times in the past.  States that he has been ambulating in sandals.     Ulcer Identification:  Ulcer Type: arterial, pressure and neuropathic  Contributing Factors: chronic pressure, shear force, arterial insufficiency and decreased tissue oxygenation          PAST MEDICAL HISTORY        Diagnosis Date    Arthritis     KNEES    CAD (coronary artery disease)     ANGIOPLASTY WITH 1 STENT    GERD (gastroesophageal reflux disease)     HX OF BLEEDING ULCER    Gout     ON RX    [place] (hard of hearing)     WEARS BILAT HEARING AIDS    Hyperlipidemia     ON RX    Hypertension     ON RX    Kidney stone     PASSED ON OWN    PVC (premature ventricular contraction) 10/2014    ON RX BETTER NOW    Sleep apnea     C-PAP    Thyroid disease 2017    HYPOTHYROIDISM-ON RX    Vision abnormalities 2016    DISTORTED RIGHT EYE    Wears dentures     UPPER FULL PLATE, LOWER PARTIAL DOES NOT WEAR LOWER    Wears glasses        PAST SURGICAL HISTORY    Past Surgical History:   Procedure Laterality Date    ABDOMEN SURGERY  2015    SURGERY TO DISLODGE GALL STONES FROM PANCREAS    APPENDECTOMY  2003    CARDIAC SURGERY  1998    ANGIOPLASTY WITH 1 STENT    CAROTID ENDARTERECTOMY Left 2006    CHOLECYSTECTOMY  2015    COLONOSCOPY      JOINT REPLACEMENT Left     KNEE    FL RELEAS VITREOUS,SUBRET/CHOROID FLUID Right 2018    VITRECTOMY 25 GAUGE, MEMBRANE PEELING performed by Richard Sanz MD at [place] [address] Right 2020    RIGHT PARTIAL 2ND TOE AMPUTATION performed by Julian Quick DPM at [address] [facility]      CAUTERIZE BLEEDER    VITRECTOMY Right 2018    with membrane stripping       FAMILY HISTORY    Family History   Problem Relation Age of Onset    Cancer Mother         OVARIAN    Cancer Father         PROSTATE    Heart Disease Father         CAD-OPEN HEART    Other Brother 71        BLOOD CLOT TO BRAIN    Other Brother 43         IN AUTO ACCIDENT       SOCIAL HISTORY    Social History     Tobacco Use    Smoking status: Never Smoker    Smokeless tobacco: Never Used   Substance Use Topics    Alcohol use: No    Drug use: No       ALLERGIES    Allergies   Allergen Reactions    Codeine Other (See Comments)     HALLUCINATIONS    Feldene [Piroxicam] Other (See Comments)     RAISES BLOOD PRESSURE         MEDICATIONS    Current Outpatient Medications on File Prior to Encounter   Medication Sig Dispense Refill    ketoconazole (NIZORAL) 2 % cream Apply topically BID to nails and skin of both feet.  1 Tube 3    levothyroxine (SYNTHROID) 25 MCG tablet Take 25 mcg by mouth Daily      simvastatin (ZOCOR) 80 MG tablet Take 80 mg by mouth nightly      potassium bicarbonate (K-LYTE) 25 MEQ disintegrating tablet Take 25 mEq by mouth daily      allopurinol (ZYLOPRIM) 300 MG tablet Take 300 mg by mouth daily      Cholecalciferol (VITAMIN D3) 78377 units CAPS Take 1 capsule by mouth once a week TAKES ON WEDS      aspirin 81 MG tablet Take 81 mg by mouth daily      pantoprazole (PROTONIX) 40 MG tablet Take 40 mg by mouth nightly      valsartan (DIOVAN) 80 MG tablet Take 80 mg by mouth nightly      metoprolol succinate (TOPROL XL) 100 MG extended release tablet Take 100 mg by mouth daily      PARoxetine (PAXIL) 20 MG tablet Take 20 mg by mouth nightly      triamterene-hydrochlorothiazide (MAXZIDE-25) 37.5-25 MG per tablet Take 1 tablet by mouth daily      Multiple Vitamins-Minerals (THERAPEUTIC MULTIVITAMIN-MINERALS) tablet Take 1 tablet by mouth daily       No current facility-administered medications on file prior to encounter. REVIEW OF SYSTEMS    Review of Systems   Constitutional: Negative for chills and fever. Gastrointestinal: Negative for diarrhea, nausea and vomiting. Musculoskeletal: Negative for myalgias. No calf pain. Skin: Negative for wound. Toe wound   Neurological: Positive for numbness. Negative for weakness. Both feet         Objective:      /65   Pulse 61   Temp 97.7 °F (36.5 °C) (Oral)   Resp 16   Ht 5' 6\" (1.676 m)   Wt 213 lb (96.6 kg)   BMI 34.38 kg/m²     Wt Readings from Last 3 Encounters:   08/26/20 213 lb (96.6 kg)   08/12/20 213 lb (96.6 kg)   07/28/20 213 lb 13.5 oz (97 kg)       Physical Exam:  General:  Alert and oriented x3. In no acute distress. Lower Extremity Physical Exam:    Vascular: DP pulses are not palpable, Bilateral. PT pulses are not palpable, Bilateral. CFT <3 seconds to all digits, Bilateral.  No edema, Bilateral.  Hair growth is absent to the level of the digits, Bilateral.  Localized edema present to right second digit. Neuro: Saph/sural/SP/DP/plantar sensation diminished to light touch. Musculoskeletal: EHL/FHL/GS/TA gross motor intact. Gross deformity is present hammertoe second digit right.      Dermatologic: Surgical incision with dehiscence to right 2nd toe to the level of fascia. No bone exposed and does not probe to bone. Sutures all pulled through the plantar skin. No erythema. No purulent drainage. No increased calor. No fluctuance, crepitus. Interdigital maceration present, Right. Nails 1-5 left and 1,3,4,5 right are thickened, elongated, yellow dystrophic, subungual debris. 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 Betadine paint between the toes. Ulceration debrided and Steri-Strips applied to maintain apposition of the wound edges. Continue ketoconazole 2% cream to be applied to nails and skin of left foot twice daily until resolved. Apply to right foot once incision heals. Instructed to return to the surgical shoe and use his walker while ambulating. Continue surgical shoe to offload surgical site and allow for healing. He is asked to minimize WB and bear weight only to the heel of the right foot. Keep toe clean and dry. Written patient discharge instructions given to patient and signed by patient or POA.          Discharge Instructions          [facility] -Phone: [phone] Fax: [phone]             Visit Yogi Instructions / Physician Orders     DATE: 8/26/2020     Home Care: NA     SUPPLIES ORDERED THRU: NA     Wound Location:  Right Second Toe     Cleanse with: Keep Dry and Intact     Dressing Orders: Paint with betadine between toes, dry gauze, ACE     Frequency: Keep Clean dry intact     Additional Orders: Increase protein to diet (meat, cheese, eggs, fish, peanut butter, nuts and beans)  Multivitamin daily  ELEVATE LEGS AS MUCH AS POSSIBLE     Your next appointment with [address] [place] [address] is in 1 week     ROOM TYPE   [x]?????? CHAIR     []?????? STRETCHER  []?????? EITHER             (Please note your next appointment above and if you are unable to keep, kindly give a 24 hour notice. Thank you.)     If you experience any of the following, please call the [address] [place] [address] during business hours:  [phone]     * Increase in Pain  * Temperature over 101  * Increase in drainage from your wound  * Drainage with a foul odor  * Bleeding  * Increase in swelling  * Need for compression bandage changes due to slippage, breakthrough drainage.     If you need medical attention outside of the business hours of the [address] [place] [address] please contact your PCP or go to the nearest emergency room.     The information contained in the After Visit Summary has been reviewed with me, the patient and/or responsible adult, by my health care provider(s). I had the opportunity to ask questions regarding this information. I have elected to receive;      []???? ?? After Visit Summary  [x]???? ?? Comprehensive Discharge Instruction        Patient signature______________________________________Date:________  Electronically signed by Sohail Ridley RN on 8/26/2020 at 9:20 AM          Electronically signed by Arthur Preciado DPM on 8/26/2020 at 9:49 AM

## 2020-09-02 ENCOUNTER — HOSPITAL ENCOUNTER (OUTPATIENT)
Dept: WOUND CARE | Age: 85
Discharge: HOME | End: 2020-09-02
Payer: MEDICARE

## 2020-09-02 VITALS
BODY MASS INDEX: 34.23 KG/M2 | SYSTOLIC BLOOD PRESSURE: 145 MMHG | HEART RATE: 58 BPM | WEIGHT: 213 LBS | TEMPERATURE: 98.5 F | HEIGHT: 66 IN | DIASTOLIC BLOOD PRESSURE: 61 MMHG | RESPIRATION RATE: 18 BRPM

## 2020-09-02 PROBLEM — L97.512 ULCER OF TOE OF RIGHT FOOT, WITH FAT LAYER EXPOSED (HCC): Status: ACTIVE | Noted: 2020-08-26

## 2020-09-02 PROCEDURE — 11042 DBRDMT SUBQ TIS 1ST 20SQCM/<: CPT

## 2020-09-02 RX ORDER — LIDOCAINE 40 MG/G
CREAM TOPICAL ONCE
Status: CANCELLED | OUTPATIENT
Start: 2020-09-09

## 2020-09-02 RX ORDER — GENTAMICIN SULFATE 1 MG/G
OINTMENT TOPICAL ONCE
Status: CANCELLED | OUTPATIENT
Start: 2020-09-09

## 2020-09-02 RX ORDER — LIDOCAINE HYDROCHLORIDE 40 MG/ML
SOLUTION TOPICAL ONCE
Status: CANCELLED | OUTPATIENT
Start: 2020-09-09

## 2020-09-02 RX ORDER — LIDOCAINE 50 MG/G
OINTMENT TOPICAL ONCE
Status: CANCELLED | OUTPATIENT
Start: 2020-09-09

## 2020-09-02 RX ORDER — BETAMETHASONE DIPROPIONATE 0.05 %
OINTMENT (GRAM) TOPICAL ONCE
Status: CANCELLED | OUTPATIENT
Start: 2020-09-09

## 2020-09-02 RX ORDER — BACITRACIN ZINC AND POLYMYXIN B SULFATE 500; 1000 [USP'U]/G; [USP'U]/G
OINTMENT TOPICAL ONCE
Status: CANCELLED | OUTPATIENT
Start: 2020-09-09

## 2020-09-02 RX ORDER — BACITRACIN, NEOMYCIN, POLYMYXIN B 400; 3.5; 5 [USP'U]/G; MG/G; [USP'U]/G
OINTMENT TOPICAL ONCE
Status: CANCELLED | OUTPATIENT
Start: 2020-09-09

## 2020-09-02 RX ORDER — CLOBETASOL PROPIONATE 0.5 MG/G
OINTMENT TOPICAL ONCE
Status: CANCELLED | OUTPATIENT
Start: 2020-09-09

## 2020-09-02 RX ORDER — GINSENG 100 MG
CAPSULE ORAL ONCE
Status: CANCELLED | OUTPATIENT
Start: 2020-09-09

## 2020-09-02 RX ORDER — LIDOCAINE HYDROCHLORIDE 20 MG/ML
JELLY TOPICAL ONCE
Status: CANCELLED
Start: 2020-09-09

## 2020-09-02 RX ORDER — LIDOCAINE HYDROCHLORIDE 20 MG/ML
JELLY TOPICAL ONCE
Status: COMPLETED | OUTPATIENT
Start: 2020-09-02 | End: 2020-09-02

## 2020-09-02 RX ADMIN — LIDOCAINE HYDROCHLORIDE 2 ML: 20 JELLY TOPICAL at 08:42

## 2020-09-02 ASSESSMENT — ENCOUNTER SYMPTOMS
NAUSEA: 0
ROS SKIN COMMENTS: TOE WOUND
VOMITING: 0
DIARRHEA: 0

## 2020-09-02 NOTE — PROGRESS NOTES
[address]   Progress Note and Procedure Note      Donald Soto  MEDICAL RECORD NUMBER:  [id number]  AGE: 82 y.o. GENDER: male  : 1935  EPISODE DATE:  2020    Subjective:     Chief Complaint   Patient presents with    Wound Check         HISTORY of PRESENT ILLNESS HPI     Donald Soto is a 82 y.o. male who presents today for wound/ulcer evaluation. Interval History:  Patient presents s/p partial amputation of the right second toe (DOS: 20). He has been ambulating in the surgical shoe since the last visit. Daughter relates that the dressing got wet and she removed it with part of the [address]. History of Wound Context: Patient presents with family with ulceration of the right second toe. Patient states that the ulcer has been present for about 1 year now. Denies being diabetic but has neuropathy in both feet. States that it has been swollen and infected multiple times in the past.  States that he has been ambulating in sandals.     Ulcer Identification:  Ulcer Type: arterial, pressure and neuropathic  Contributing Factors: chronic pressure, shear force, arterial insufficiency and decreased tissue oxygenation          PAST MEDICAL HISTORY        Diagnosis Date    Arthritis     KNEES    CAD (coronary artery disease)     ANGIOPLASTY WITH 1 STENT    GERD (gastroesophageal reflux disease)     HX OF BLEEDING ULCER    Gout     ON RX    [place] (hard of hearing)     WEARS BILAT HEARING AIDS    Hyperlipidemia     ON RX    Hypertension     ON RX    Kidney stone     PASSED ON OWN    PVC (premature ventricular contraction) 10/2014    ON RX BETTER NOW    Sleep apnea     C-PAP    Thyroid disease 2017    HYPOTHYROIDISM-ON RX    Vision abnormalities 2016    DISTORTED RIGHT EYE    Wears dentures     UPPER FULL PLATE, LOWER PARTIAL DOES NOT WEAR LOWER    Wears glasses        PAST SURGICAL HISTORY    Past Surgical History:   Procedure Laterality Date    ABDOMEN SURGERY  2015    SURGERY TO DISLODGE GALL STONES FROM PANCREAS    APPENDECTOMY  2003    CARDIAC SURGERY  1998    ANGIOPLASTY WITH 1 STENT    CAROTID ENDARTERECTOMY Left 2006    CHOLECYSTECTOMY  2015    COLONOSCOPY      JOINT REPLACEMENT Left     KNEE    NV RELEAS VITREOUS,SUBRET/CHOROID FLUID Right 2018    VITRECTOMY 25 GAUGE, MEMBRANE PEELING performed by Tam Rico MD at [place] [address] Right 2020    RIGHT PARTIAL 2ND TOE AMPUTATION performed by Arthur Preciado DPM at [facility]      CAUTERIZE BLEEDER    VITRECTOMY Right 2018    with membrane stripping       FAMILY HISTORY    Family History   Problem Relation Age of Onset    Cancer Mother         OVARIAN    Cancer Father         PROSTATE    Heart Disease Father         CAD-OPEN HEART    Other Brother 71        BLOOD CLOT TO BRAIN    Other Brother 43         IN AUTO ACCIDENT       SOCIAL HISTORY    Social History     Tobacco Use    Smoking status: Never Smoker    Smokeless tobacco: Never Used   Substance Use Topics    Alcohol use: No    Drug use: No       ALLERGIES    Allergies   Allergen Reactions    Codeine Other (See Comments)     HALLUCINATIONS    Feldene [Piroxicam] Other (See Comments)     RAISES BLOOD PRESSURE         MEDICATIONS    Current Outpatient Medications on File Prior to Encounter   Medication Sig Dispense Refill    ketoconazole (NIZORAL) 2 % cream Apply topically BID to nails and skin of both feet.  1 Tube 3    levothyroxine (SYNTHROID) 25 MCG tablet Take 25 mcg by mouth Daily      simvastatin (ZOCOR) 80 MG tablet Take 80 mg by mouth nightly      potassium bicarbonate (K-LYTE) 25 MEQ disintegrating tablet Take 25 mEq by mouth daily      allopurinol (ZYLOPRIM) 300 MG tablet Take 300 mg by mouth daily      Cholecalciferol (VITAMIN D3) 76677 units CAPS Take 1 capsule by mouth once a week TAKES ON       aspirin 81 MG tablet Take 81 mg by mouth daily      pantoprazole (PROTONIX) 40 MG tablet Take 40 mg by mouth nightly      valsartan (DIOVAN) 80 MG tablet Take 80 mg by mouth nightly      metoprolol succinate (TOPROL XL) 100 MG extended release tablet Take 100 mg by mouth daily      PARoxetine (PAXIL) 20 MG tablet Take 20 mg by mouth nightly      triamterene-hydrochlorothiazide (MAXZIDE-25) 37.5-25 MG per tablet Take 1 tablet by mouth daily      Multiple Vitamins-Minerals (THERAPEUTIC MULTIVITAMIN-MINERALS) tablet Take 1 tablet by mouth daily       No current facility-administered medications on file prior to encounter. REVIEW OF SYSTEMS    Review of Systems   Constitutional: Negative for chills and fever. Gastrointestinal: Negative for diarrhea, nausea and vomiting. Skin: Negative for wound. Toe wound   Neurological: Positive for numbness. Negative for weakness. Both feet         Objective:      BP (!) 145/61   Pulse 58   Temp 98.5 °F (36.9 °C) (Oral)   Resp 18   Ht 5' 6\" (1.676 m)   Wt 213 lb (96.6 kg)   BMI 34.38 kg/m²     Wt Readings from Last 3 Encounters:   09/02/20 213 lb (96.6 kg)   08/26/20 213 lb (96.6 kg)   08/12/20 213 lb (96.6 kg)       Physical Exam:  General:  Alert and oriented x3. In no acute distress. Lower Extremity Physical Exam:    Vascular: DP pulses are not palpable, Bilateral. PT pulses are not palpable, Bilateral. CFT <3 seconds to all digits, Bilateral.  No edema, Bilateral.  Hair growth is absent to the level of the digits, Bilateral.  Localized edema present to right second digit. Neuro: Saph/sural/SP/DP/plantar sensation diminished to light touch. Musculoskeletal: EHL/FHL/GS/TA gross motor intact. Gross deformity is present hammertoe second digit right. Dermatologic: Surgical incision with dehiscence to right 2nd toe to the level of subcutaneous tissue. There is small area of healing centrally. No bone exposed and does not probe to bone. No erythema. No purulent drainage. No increased calor. No fluctuance, crepitus. Interdigital maceration present, Right. Nails 1-5 left and 1,3,4,5 right are thickened, yellow dystrophic, subungual debris. PVR/PPG 08/24/20  Noncompressible vessels bilaterally suggestive of calcific vascular     disease.     Normal PVR waveforms at rest of the bilateral lower extremity to the calf     level.     Severe small vessel and/or vasospastic disease of the digits. Assessment:      Active Hospital Problems    Diagnosis Date Noted    Ulcer of toe of right foot, with fat layer exposed ([address] [L97.512] 08/26/2020    Surgical wound dehiscence [T81.31XA] 08/26/2020    History of amputation of lesser toe of right foot [facility]) [Z89.421] 08/05/2020    Idiopathic neuropathy [G60.9] 07/08/2020       Plan:     Treatment Note please see attached Discharge Instructions    Wound edges curetted of all slough and fibrotic tissue and Steri-Strips applied to maintain apposition of the wound edges with tincture of benzoin. Instructed to continue use of the surgical shoe and use his walker while ambulating. Continue surgical shoe to offload surgical site and allow for healing. He is asked to minimize WB and bear weight only to the heel of the right foot. Keep toe clean and dry. Daughter to change dressing, outer layer only, if it gets wet. Instructed to leave steri-strips in place. Will place adaptic layer over steri-strips to prevent removal.     Procedure:  Wound Location: right    Lidocaine gel soaked gauze was applied at beginning of wound evaluation. The wound(s) was excisionally debrided sharply of fibrotic, necrotic, and hyperkeratotic tissue, including a layer of surrounding healthy tissue using curette. The wound was debrided down through and including subcutaneous.     Wound 08/26/20 Toe (Comment  which one) Right Distal 2nd Toe #1 (Active)   Wound Image   09/02/20 8784   Wound Non-Healing Surgical 09/02/20 6654 Offloading for Diabetic Foot Ulcers Refused 08/26/20 0852   Dressing Status Old drainage 09/02/20 0836   Dressing Changed Changed/New 09/02/20 0836   Dressing/Treatment Other (comment) 08/26/20 0852   Wound Cleansed Rinsed/Irrigated with saline 09/02/20 0836   Wound Length (cm) 0.1 cm 09/02/20 0836   Wound Width (cm) 1.6 cm 09/02/20 0836   Wound Depth (cm) 0.1 cm 09/02/20 0836   Wound Surface Area (cm^2) 0.16 cm^2 09/02/20 0836   Change in Wound Size % (l*w) 66.67 09/02/20 0836   Wound Volume (cm^3) 0.02 cm^3 09/02/20 0836   Wound Healing % 80 09/02/20 0836   Post-Procedure Length (cm) 0.1 cm 09/02/20 0836   Post-Procedure Width (cm) 1.6 cm 09/02/20 0836   Post-Procedure Depth (cm) 0.1 cm 09/02/20 0836   Post-Procedure Surface Area (cm^2) 0.16 cm^2 09/02/20 0836   Post-Procedure Volume (cm^3) 0.02 cm^3 09/02/20 0836   Wound Assessment Drainage;[place] 09/02/20 0836   Drainage Amount Small 09/02/20 0836   Drainage Description Serosanguinous 09/02/20 0836   Odor None 09/02/20 0836   Margins Defined edges 09/02/20 0836   Julieta-wound Assessment Pink;Edema 09/02/20 0836   [place]%Wound Bed 100 09/02/20 0836   Number of days: 7       Percent of Wound Debrided: 100%    Total Surface Area Debrided:  0.16 sq cm    Bleeding: Minimal    Patient tolerated procedure well and was given proper instruction. Post debridement wound description:  clean, open  Post debridement Wound Location:right foot      Written patient discharge instructions given to patient and signed by patient or POA.          Discharge Instructions          [facility] -Phone: [phone] Fax: [phone]             Visit  Discharge Instructions / Physician Orders     DATE: 9/2/2020     Home Care: NA     SUPPLIES ORDERED THRU: NA     Wound Location:  Right Second Toe     Cleanse with: Keep Dry and Intact     Dressing Orders: Adaptic, dry gauze, ACE     Frequency: Keep Clean dry intact     Additional Orders: Increase protein to diet (meat, cheese, eggs, fish, peanut butter, nuts and beans)  Multivitamin daily  ELEVATE LEGS AS MUCH AS POSSIBLE     Your next appointment with Flor Gordillo [facility] is in 1 week     ROOM TYPE   [x]??????? CHAIR     []??????? STRETCHER  []??????? EITHER             (Please note your next appointment above and if you are unable to keep, kindly give a 24 hour notice. Thank you.)     If you experience any of the following, please call the [address] [place] [address] during business hours:  [phone]     * Increase in Pain  * Temperature over 101  * Increase in drainage from your wound  * Drainage with a foul odor  * Bleeding  * Increase in swelling  * Need for compression bandage changes due to slippage, breakthrough drainage.     If you need medical attention outside of the business hours of the [address] [place] [address] please contact your PCP or go to the nearest emergency room.     The information contained in the After Visit Summary has been reviewed with me, the patient and/or responsible adult, by my health care provider(s). I had the opportunity to ask questions regarding this information. I have elected to receive;      []????? ?? After Visit Summary  [x]??????? Comprehensive Discharge Instruction        Patient signature______________________________________Date:________  Electronically signed by Luis Antonio Reis RN on 9/2/2020 at 9:16 AM  Electronically signed by Massiel Moreno DPM on 9/2/2020 at 8:27 AM          Electronically signed by Massiel Moreno DPM on 9/2/2020 at 9:26 AM

## 2020-09-08 ENCOUNTER — HOSPITAL ENCOUNTER (OUTPATIENT)
Dept: WOUND CARE | Age: 85
Discharge: HOME | End: 2020-09-08
Payer: MEDICARE

## 2020-09-08 VITALS
HEIGHT: 66 IN | RESPIRATION RATE: 18 BRPM | HEART RATE: 66 BPM | DIASTOLIC BLOOD PRESSURE: 52 MMHG | BODY MASS INDEX: 34.23 KG/M2 | TEMPERATURE: 98.5 F | WEIGHT: 213 LBS | SYSTOLIC BLOOD PRESSURE: 137 MMHG

## 2020-09-08 PROBLEM — I25.9 CHRONIC ISCHEMIC HEART DISEASE: Status: ACTIVE | Noted: 2020-09-08

## 2020-09-08 PROBLEM — M19.90 OSTEOARTHRITIS: Status: ACTIVE | Noted: 2020-09-08

## 2020-09-08 PROBLEM — M10.9 GOUT: Status: ACTIVE | Noted: 2020-09-08

## 2020-09-08 PROBLEM — I10 ESSENTIAL HYPERTENSION: Status: ACTIVE | Noted: 2020-09-08

## 2020-09-08 PROBLEM — H90.3 SENSORINEURAL HEARING LOSS, BILATERAL: Status: ACTIVE | Noted: 2020-09-08

## 2020-09-08 PROCEDURE — 99203 OFFICE O/P NEW LOW 30 MIN: CPT | Performed by: NURSE PRACTITIONER

## 2020-09-08 PROCEDURE — 99212 OFFICE O/P EST SF 10 MIN: CPT

## 2020-09-08 RX ORDER — GINSENG 100 MG
CAPSULE ORAL ONCE
Status: CANCELLED | OUTPATIENT
Start: 2020-09-09

## 2020-09-08 RX ORDER — BETAMETHASONE DIPROPIONATE 0.05 %
OINTMENT (GRAM) TOPICAL ONCE
Status: CANCELLED | OUTPATIENT
Start: 2020-09-09

## 2020-09-08 RX ORDER — CLOBETASOL PROPIONATE 0.5 MG/G
OINTMENT TOPICAL ONCE
Status: CANCELLED | OUTPATIENT
Start: 2020-09-09

## 2020-09-08 RX ORDER — LIDOCAINE 50 MG/G
OINTMENT TOPICAL ONCE
Status: CANCELLED | OUTPATIENT
Start: 2020-09-09

## 2020-09-08 RX ORDER — LIDOCAINE HYDROCHLORIDE 20 MG/ML
JELLY TOPICAL ONCE
Status: CANCELLED
Start: 2020-09-09

## 2020-09-08 RX ORDER — BACITRACIN ZINC AND POLYMYXIN B SULFATE 500; 1000 [USP'U]/G; [USP'U]/G
OINTMENT TOPICAL ONCE
Status: CANCELLED | OUTPATIENT
Start: 2020-09-09

## 2020-09-08 RX ORDER — LIDOCAINE HYDROCHLORIDE 20 MG/ML
JELLY TOPICAL ONCE
Status: COMPLETED | OUTPATIENT
Start: 2020-09-08 | End: 2020-09-08

## 2020-09-08 RX ORDER — LIDOCAINE HYDROCHLORIDE 40 MG/ML
SOLUTION TOPICAL ONCE
Status: CANCELLED | OUTPATIENT
Start: 2020-09-09

## 2020-09-08 RX ORDER — GENTAMICIN SULFATE 1 MG/G
OINTMENT TOPICAL ONCE
Status: CANCELLED | OUTPATIENT
Start: 2020-09-09

## 2020-09-08 RX ORDER — LIDOCAINE 40 MG/G
CREAM TOPICAL ONCE
Status: CANCELLED | OUTPATIENT
Start: 2020-09-09

## 2020-09-08 RX ORDER — BACITRACIN, NEOMYCIN, POLYMYXIN B 400; 3.5; 5 [USP'U]/G; MG/G; [USP'U]/G
OINTMENT TOPICAL ONCE
Status: CANCELLED | OUTPATIENT
Start: 2020-09-09

## 2020-09-08 RX ADMIN — LIDOCAINE HYDROCHLORIDE 6 ML: 20 JELLY TOPICAL at 08:50

## 2020-09-08 ASSESSMENT — PAIN SCALES - GENERAL: PAINLEVEL_OUTOF10: 0

## 2020-09-08 NOTE — PROGRESS NOTES
ENDARTERECTOMY Left 2006    CHOLECYSTECTOMY      COLONOSCOPY      JOINT REPLACEMENT Left     KNEE    SD RELEAS VITREOUS,SUBRET/CHOROID FLUID Right 2018    VITRECTOMY 25 GAUGE, MEMBRANE PEELING performed by Clint Estevez MD at [place] [address] Right 2020    RIGHT PARTIAL 2ND TOE AMPUTATION performed [facility] D[facility] at [facility]      CAUTERIZE BLEEDER    VITRECTOMY Right 2018    with membrane stripping       FAMILY HISTORY    Family History   Problem Relation Age of Onset    Cancer Mother         OVARIAN    Cancer Father         PROSTATE    Heart Disease Father         CAD-OPEN HEART    Other Brother 71        BLOOD CLOT TO BRAIN    Other Brother 43         IN AUTO ACCIDENT       SOCIAL HISTORY    Social History     Tobacco Use    Smoking status: Never Smoker    Smokeless tobacco: Never Used   Substance Use Topics    Alcohol use: No    Drug use: No       ALLERGIES    Allergies   Allergen Reactions    Codeine Other (See Comments)     HALLUCINATIONS    Feldene [Piroxicam] Other (See Comments)     RAISES BLOOD PRESSURE         MEDICATIONS    Current Outpatient Medications on File Prior to Encounter   Medication Sig Dispense Refill    ketoconazole (NIZORAL) 2 % cream Apply topically BID to nails and skin of both feet.  1 Tube 3    levothyroxine (SYNTHROID) 25 MCG tablet Take 25 mcg by mouth Daily      simvastatin (ZOCOR) 80 MG tablet Take 80 mg by mouth nightly      potassium bicarbonate (K-LYTE) 25 MEQ disintegrating tablet Take 25 mEq by mouth daily      allopurinol (ZYLOPRIM) 300 MG tablet Take 300 mg by mouth daily      Cholecalciferol (VITAMIN D3) 54642 units CAPS Take 1 capsule by mouth once a week TAKES ON       aspirin 81 MG tablet Take 81 mg by mouth daily      pantoprazole (PROTONIX) 40 MG tablet Take 40 mg by mouth nightly      valsartan (DIOVAN) 80 MG tablet Take 80 mg by mouth nightly      metoprolol succinate (TOPROL XL) 100 MG extended release tablet Take 100 mg by mouth daily      PARoxetine (PAXIL) 20 MG tablet Take 20 mg by mouth nightly      triamterene-hydrochlorothiazide (MAXZIDE-25) 37.5-25 MG per tablet Take 1 tablet by mouth daily      Multiple Vitamins-Minerals (THERAPEUTIC MULTIVITAMIN-MINERALS) tablet Take 1 tablet by mouth daily       No current facility-administered medications on file prior to encounter. REVIEW OF SYSTEMS    Pertinent items are noted in HPI.     Objective:      BP (!) 137/52   Pulse 66   Temp 98.5 °F (36.9 °C) (Oral)   Resp 18   Ht 5' 6\" (1.676 m)   Wt 213 lb (96.6 kg)   BMI 34.38 kg/m²     Wt Readings from Last 3 Encounters:   09/08/20 213 lb (96.6 kg)   09/02/20 213 lb (96.6 kg)   08/26/20 213 lb (96.6 kg)       PHYSICAL EXAM    General Appearance: alert and oriented to person, place and time, well-developed and obese, in no acute distress  Skin: warm and dry, no rash or erythema  Head: normocephalic and atraumatic  Eyes: pupils equal, round, and conjunctivae normal  Pulmonary/Chest: normal air movement, no respiratory distress  Extremities: no cyanosis and no clubbing  Musculoskeletal: no joint swelling, deformity or tenderness  Neurologic: gait, coordination normal and speech normal      Assessment:     Problem List Items Addressed This Visit     Hammertoe of second toe of right foot    Relevant Orders    Supply: Wound Cleanser    Supply: Wound Dressings    Supply: Cover and Secure    Idiopathic neuropathy    Relevant Orders    Supply: Wound Cleanser    Supply: Wound Dressings    Supply: Cover and Secure    Tinea pedis of both feet    Relevant Orders    Supply: Wound Cleanser    Supply: Wound Dressings    Supply: Cover and Secure    Onychomycosis    Relevant Orders    Supply: Wound Cleanser    Supply: Wound Dressings    Supply: Cover and Secure    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 Performed by: AIMEE Silva - CNP      Wound Measurements:  Wound/Ulcer Descriptions are Pre Debridement except measurements:    Wound 08/26/20 Toe (Comment  which one) Right Distal 2nd Toe #1 (Active)   Wound Image   09/02/20 0836   Wound Non-Healing Surgical 09/08/20 0848   Offloading for Diabetic Foot Ulcers Post op shoe 09/08/20 0848   Dressing Status New drainage; Old drainage 09/08/20 0848   Dressing Changed Changed/New 09/08/20 0848   Dressing/Treatment Other (comment) 08/26/20 0852   Wound Cleansed Soap and water 09/08/20 0848   Wound Length (cm) 0 cm 09/08/20 0848   Wound Width (cm) 0 cm 09/08/20 0848   Wound Depth (cm) 0 cm 09/08/20 0848   Wound Surface Area (cm^2) 0 cm^2 09/08/20 0848   Change in Wound Size % (l*w) 100 09/08/20 0848   Wound Volume (cm^3) 0 cm^3 09/08/20 0848   Wound Healing % 100 09/08/20 0848   Post-Procedure Length (cm) 0 cm 09/08/20 0848   Post-Procedure Width (cm) 0 cm 09/08/20 0848   Post-Procedure Depth (cm) 0 cm 09/08/20 0848   Post-Procedure Surface Area (cm^2) 0 cm^2 09/08/20 0848   Post-Procedure Volume (cm^3) 0 cm^3 09/08/20 0848   Wound Assessment Intact; Pink 09/08/20 0848   Drainage Amount Moderate 09/08/20 0848   Drainage Description Serous 09/08/20 0848   Odor None 09/08/20 0848   Margins Defined edges 09/08/20 0848   Julieta-wound Assessment Blanchable erythema 09/08/20 0848   [place]%Wound Bed 100 09/08/20 0848   Number of days: 13            Plan:     Continue current dressing   Follow up with Dr Shana Pickens last week     Treatment Note please see attached Discharge Instructions    Written patient dismissal instructions given to patient and signed by patient or POA. Discharge Instructions          [address]: [phone] Fax: [phone]             Visit  Discharge Instructions / Physician Orders     DATE: 9/8/2020     Home Care: NA      SUPPLIES ORDERED THRU: NA     Wound Location:  Right Second Toe     Cleanse with: Keep Dry and Intact     Dressing Orders: Steri strips, Adaptic, dry gauze, ACE (low compression just to hold dressings in place)     Frequency: Keep Clean dry intact     Additional Orders: Increase protein to diet (meat, cheese, eggs, fish, peanut butter, nuts and beans)  Multivitamin daily  ELEVATE LEGS AS MUCH AS POSSIBLE     Your next appointment with [facility] is in 1 week with Dr. Eulogio Cassidy   [x]???????? CHAIR     []???????? STRETCHER  []???????? EITHER             (Please note your next appointment above and if you are unable to keep, kindly give a 24 hour notice. Thank you.)     If you experience any of the following, [facility] during business hours:  [phone]     * Increase in Pain  * Temperature over 101  * Increase in drainage from your wound  * Drainage with a foul odor  * Bleeding  * Increase in swelling  * Need for compression bandage changes due to slippage, breakthrough drainage.     If you need medical attention outside of the business hours of the [facility] please contact your PCP or go to the nearest emergency room.     The information contained in the After Visit Summary has been reviewed with me, the patient and/or responsible adult, by my health care provider(s). I had the opportunity to ask questions regarding this information. I have elected to receive;      []?????? ?? After Visit Summary  [x]???????? Comprehensive Discharge Instruction        Patient signature______________________________________Date:________  Electronically signed by Muriel Babinski,

## 2020-09-16 ENCOUNTER — HOSPITAL ENCOUNTER (OUTPATIENT)
Dept: WOUND CARE | Age: 85
Discharge: HOME | End: 2020-09-16

## 2020-09-23 ENCOUNTER — HOSPITAL ENCOUNTER (OUTPATIENT)
Dept: WOUND CARE | Age: 85
Discharge: HOME | End: 2020-09-23
Payer: MEDICARE

## 2020-09-23 VITALS
RESPIRATION RATE: 17 BRPM | BODY MASS INDEX: 34.23 KG/M2 | HEART RATE: 51 BPM | TEMPERATURE: 98.1 F | HEIGHT: 66 IN | SYSTOLIC BLOOD PRESSURE: 134 MMHG | DIASTOLIC BLOOD PRESSURE: 69 MMHG | WEIGHT: 213 LBS

## 2020-09-23 PROBLEM — L97.512 ULCER OF TOE OF RIGHT FOOT, WITH FAT LAYER EXPOSED (HCC): Status: RESOLVED | Noted: 2020-08-26 | Resolved: 2020-09-23

## 2020-09-23 PROBLEM — T81.31XA SURGICAL WOUND DEHISCENCE: Status: RESOLVED | Noted: 2020-08-26 | Resolved: 2020-09-23

## 2020-09-23 PROCEDURE — 99212 OFFICE O/P EST SF 10 MIN: CPT

## 2020-09-23 RX ORDER — LIDOCAINE 50 MG/G
OINTMENT TOPICAL ONCE
Status: CANCELLED | OUTPATIENT
Start: 2020-09-30

## 2020-09-23 RX ORDER — GENTAMICIN SULFATE 1 MG/G
OINTMENT TOPICAL ONCE
Status: CANCELLED | OUTPATIENT
Start: 2020-09-30

## 2020-09-23 RX ORDER — BACITRACIN, NEOMYCIN, POLYMYXIN B 400; 3.5; 5 [USP'U]/G; MG/G; [USP'U]/G
OINTMENT TOPICAL ONCE
Status: CANCELLED | OUTPATIENT
Start: 2020-09-30

## 2020-09-23 RX ORDER — LIDOCAINE HYDROCHLORIDE 40 MG/ML
SOLUTION TOPICAL ONCE
Status: CANCELLED | OUTPATIENT
Start: 2020-09-30

## 2020-09-23 RX ORDER — BETAMETHASONE DIPROPIONATE 0.05 %
OINTMENT (GRAM) TOPICAL ONCE
Status: CANCELLED | OUTPATIENT
Start: 2020-09-30

## 2020-09-23 RX ORDER — LIDOCAINE 40 MG/G
CREAM TOPICAL ONCE
Status: CANCELLED | OUTPATIENT
Start: 2020-09-30

## 2020-09-23 RX ORDER — BACITRACIN ZINC AND POLYMYXIN B SULFATE 500; 1000 [USP'U]/G; [USP'U]/G
OINTMENT TOPICAL ONCE
Status: CANCELLED | OUTPATIENT
Start: 2020-09-30

## 2020-09-23 RX ORDER — GINSENG 100 MG
CAPSULE ORAL ONCE
Status: CANCELLED | OUTPATIENT
Start: 2020-09-30

## 2020-09-23 RX ORDER — LIDOCAINE HYDROCHLORIDE 20 MG/ML
JELLY TOPICAL ONCE
Status: CANCELLED
Start: 2020-09-30

## 2020-09-23 RX ORDER — CLOBETASOL PROPIONATE 0.5 MG/G
OINTMENT TOPICAL ONCE
Status: CANCELLED | OUTPATIENT
Start: 2020-09-30

## 2020-09-23 ASSESSMENT — ENCOUNTER SYMPTOMS
VOMITING: 0
DIARRHEA: 0
ROS SKIN COMMENTS: TOE WOUND
NAUSEA: 0

## 2020-09-23 NOTE — PROGRESS NOTES
[address]   Progress Note and Procedure Note      Gifty Haynes  MEDICAL RECORD NUMBER:  [id number]  AGE: 82 y.o. GENDER: male  : 1935  EPISODE DATE:  2020    Subjective:     Chief Complaint   Patient presents with    Wound Check         HISTORY of PRESENT ILLNESS HPI     Gifyt Haynes is a 82 y.o. male who presents today for wound/ulcer evaluation. Interval History:  Patient presents s/p partial amputation of the right second toe (DOS: 20). He has been ambulating in the surgical shoe since the last visit. He will be undergoing heart surgery tomorrow. Denies any new problem. Is any increased redness or drainage from the surgical site. History of Wound Context: Patient presents with family with ulceration of the right second toe. Patient states that the ulcer has been present for about 1 year now. Denies being diabetic but has neuropathy in both feet. States that it has been swollen and infected multiple times in the past.  States that he has been ambulating in sandals.     Ulcer Identification:  Ulcer Type: arterial, pressure and neuropathic  Contributing Factors: chronic pressure, shear force, arterial insufficiency and decreased tissue oxygenation          PAST MEDICAL HISTORY        Diagnosis Date    Arthritis     KNEES    CAD (coronary artery disease)     ANGIOPLASTY WITH 1 STENT    GERD (gastroesophageal reflux disease)     HX OF BLEEDING ULCER    Gout     ON RX    [place] (hard of hearing)     WEARS BILAT HEARING AIDS    Hyperlipidemia     ON RX    Hypertension     ON RX    Kidney stone     PASSED ON OWN    PVC (premature ventricular contraction) 10/2014    ON RX BETTER NOW    Sleep apnea     C-PAP    Thyroid disease 2017    HYPOTHYROIDISM-ON RX    Vision abnormalities 2016    DISTORTED RIGHT EYE    Wears dentures     UPPER FULL PLATE, LOWER PARTIAL DOES NOT WEAR LOWER    Wears glasses        PAST SURGICAL HISTORY    Past Surgical History:   Procedure Laterality Date    ABDOMEN SURGERY  2015    SURGERY TO DISLODGE GALL STONES FROM PANCREAS    APPENDECTOMY  2003    CARDIAC SURGERY  1998    ANGIOPLASTY WITH 1 STENT    CAROTID ENDARTERECTOMY Left 2006    CHOLECYSTECTOMY  2015    COLONOSCOPY      JOINT REPLACEMENT Left     KNEE    RI RELEAS VITREOUS,SUBRET/CHOROID FLUID Right 2018    VITRECTOMY 25 GAUGE, MEMBRANE PEELING performed by Aaliyah Salgado MD at [place] [address] Right 2020    RIGHT PARTIAL 2ND TOE AMPUTATION performed by Priti Julian DPM at [address]      CAUTERIZE BLEEDER    VITRECTOMY Right 2018    with membrane stripping       FAMILY HISTORY    Family History   Problem Relation Age of Onset    Cancer Mother         OVARIAN    Cancer Father         PROSTATE    Heart Disease Father         CAD-OPEN HEART    Other Brother 71        BLOOD CLOT TO BRAIN    Other Brother 43         IN AUTO ACCIDENT       SOCIAL HISTORY    Social History     Tobacco Use    Smoking status: Never Smoker    Smokeless tobacco: Never Used   Substance Use Topics    Alcohol use: No    Drug use: No       ALLERGIES    Allergies   Allergen Reactions    Codeine Other (See Comments)     HALLUCINATIONS    Feldene [Piroxicam] Other (See Comments)     RAISES BLOOD PRESSURE         MEDICATIONS    Current Outpatient Medications on File Prior to Encounter   Medication Sig Dispense Refill    ketoconazole (NIZORAL) 2 % cream Apply topically BID to nails and skin of both feet.  1 Tube 3    levothyroxine (SYNTHROID) 25 MCG tablet Take 25 mcg by mouth Daily      simvastatin (ZOCOR) 80 MG tablet Take 80 mg by mouth nightly      potassium bicarbonate (K-LYTE) 25 MEQ disintegrating tablet Take 25 mEq by mouth daily      allopurinol (ZYLOPRIM) 300 MG tablet Take 300 mg by mouth daily      Cholecalciferol (VITAMIN D3) 30087 units CAPS Take 1 capsule by mouth once a week TAKES ON WEDS      aspirin 81 MG tablet Take 81 mg by mouth daily      pantoprazole (PROTONIX) 40 MG tablet Take 40 mg by mouth nightly      valsartan (DIOVAN) 80 MG tablet Take 80 mg by mouth nightly      metoprolol succinate (TOPROL XL) 100 MG extended release tablet Take 100 mg by mouth daily      PARoxetine (PAXIL) 20 MG tablet Take 20 mg by mouth nightly      triamterene-hydrochlorothiazide (MAXZIDE-25) 37.5-25 MG per tablet Take 1 tablet by mouth daily      Multiple Vitamins-Minerals (THERAPEUTIC MULTIVITAMIN-MINERALS) tablet Take 1 tablet by mouth daily       No current facility-administered medications on file prior to encounter. REVIEW OF SYSTEMS    Review of Systems   Constitutional: Negative for chills and fever. Gastrointestinal: Negative for diarrhea, nausea and vomiting. Skin: Negative for wound. Toe wound   Neurological: Positive for numbness. Negative for weakness. Both feet         Objective:      /69   Pulse 51   Temp 98.1 °F (36.7 °C) (Oral)   Resp 17   Ht 5' 6\" (1.676 m)   Wt 213 lb (96.6 kg)   BMI 34.38 kg/m²     Wt Readings from Last 3 Encounters:   09/23/20 213 lb (96.6 kg)   09/08/20 213 lb (96.6 kg)   09/02/20 213 lb (96.6 kg)       Physical Exam:  General:  Alert and oriented x3. In no acute distress. Lower Extremity Physical Exam:    Vascular: DP pulses are not palpable, Bilateral. PT pulses are not palpable, Bilateral. CFT <3 seconds to all digits, Bilateral.  No edema, Bilateral.  Hair growth is absent to the level of the digits, Bilateral.  Localized edema present to right second digit. Neuro: Saph/sural/SP/DP/plantar sensation diminished to light touch. Musculoskeletal: EHL/FHL/GS/TA gross motor intact. Gross deformity is present hammertoe second digit right. Dermatologic: Surgical incision epithelialized. No erythema. No purulent drainage. No increased calor. No fluctuance, crepitus.   Interdigital maceration absent, Right. Nails 1-5 left and 1,3,4,5 right are thickened, yellow dystrophic, subungual debris. PVR/PPG 08/24/20  Noncompressible vessels bilaterally suggestive of calcific vascular     disease.     Normal PVR waveforms at rest of the bilateral lower extremity to the calf     level.     Severe small vessel and/or vasospastic disease of the digits. Assessment:      Active Hospital Problems    Diagnosis Date Noted    History of amputation of lesser toe of right foot [facility]) [Z89.421] 08/05/2020    Idiopathic neuropathy [G60.9] 07/08/2020       Plan:     Treatment Note please see attached Discharge Instructions    Instructed to continue use of the surgical shoe and use his walker while ambulating to ensure that the wound remains healed for the next week. Okay to transition to regular supportive shoe gear. Educated on signs and symptoms of infection. Instructed to call clinic immediately or go to ER if signs and symptoms of infection are present. Return to clinic in about 1 month or sooner if any problem arises or new wound develops. Written patient discharge instructions given to patient and signed by patient or POA.          Discharge Instructions          [facility] -Phone: [phone] Fax: [phone]             Visit  Discharge Instructions / Physician Orders     DATE: 9/23/2020     Home Care: NA      SUPPLIES ORDERED THRU: NA     Wound Location:  Right Second Toe     Cleanse with: Liquid antibacterial soap and water, rinse well     Dressing Orders: Wear postop shoes     Frequency: Keep Dry     Additional Orders: Increase protein to diet (meat, cheese, eggs, fish, peanut butter, nuts and beans)  Multivitamin daily  ELEVATE LEGS AS MUCH AS POSSIBLE     Your next appointment with [address] [place] [address] is in 1 month with Dr. Sendy De La Rosa   [x]????????? CHAIR     []????????? STRETCHER  []????????? EITHER             (Please note your next appointment above and if you are unable to keep, kindly give a 24 hour notice. Thank you.)     If you experience any of the following, please call the [address] during business hours:  [phone]     * Increase in Pain  * Temperature over 101  * Increase in drainage from your wound  * Drainage with a foul odor  * Bleeding  * Increase in swelling  * Need for compression bandage changes due to slippage, breakthrough drainage.     If you need medical attention outside of the business hours of the [address] please contact your PCP or go to the nearest emergency room.     The information contained in the After Visit Summary has been reviewed with me, the patient and/or responsible adult, by my health care provider(s). I had the opportunity to ask questions regarding this information. I have elected to receive;      []??????? ?? After Visit Summary  [x]????????? Comprehensive Discharge Instruction        Patient signature______________________________________Date:________  Electronically signed by Kristel Shepard RN on 9/23/2020 at 8:26 AM  Electronically signed by Sarah Clayton DPM on 9/23/2020 at 8:23 AM          Electronically signed by Sarah Clayton DPM on 9/23/2020 at 8:31 AM

## 2020-10-21 ENCOUNTER — HOSPITAL ENCOUNTER (OUTPATIENT)
Dept: WOUND CARE | Age: 85
Discharge: HOME | End: 2020-10-21
Payer: MEDICARE

## 2020-10-21 VITALS
TEMPERATURE: 98.4 F | RESPIRATION RATE: 18 BRPM | HEART RATE: 55 BPM | WEIGHT: 205 LBS | DIASTOLIC BLOOD PRESSURE: 59 MMHG | BODY MASS INDEX: 32.95 KG/M2 | SYSTOLIC BLOOD PRESSURE: 128 MMHG | HEIGHT: 66 IN

## 2020-10-21 PROBLEM — I73.9 PVD (PERIPHERAL VASCULAR DISEASE) (HCC): Status: ACTIVE | Noted: 2020-10-21

## 2020-10-21 PROCEDURE — 11720 DEBRIDE NAIL 1-5: CPT

## 2020-10-21 PROCEDURE — 11721 DEBRIDE NAIL 6 OR MORE: CPT

## 2020-10-21 ASSESSMENT — ENCOUNTER SYMPTOMS
NAUSEA: 0
VOMITING: 0
DIARRHEA: 0

## 2020-10-21 NOTE — PROGRESS NOTES
Surgical History:   Procedure Laterality Date    ABDOMEN SURGERY  2015    SURGERY TO DISLODGE GALL STONES FROM PANCREAS    ANGIOPLASTY      APPENDECTOMY  2003    CARDIAC SURGERY  1998    ANGIOPLASTY WITH 1 STENT    CAROTID ENDARTERECTOMY Left 2006    CHOLECYSTECTOMY  2015    COLONOSCOPY      JOINT REPLACEMENT Left 1999    KNEE    NM RELEAS VITREOUS,SUBRET/CHOROID FLUID Right 2018    VITRECTOMY 25 GAUGE, MEMBRANE PEELING performed by Mary Beth Burgess MD at [place] [address] Right 2020    RIGHT PARTIAL 2ND TOE AMPUTATION performed by Disha Meyer DPM at [address] [place] [address]      CAUTERIZE BLEEDER    VITRECTOMY Right 2018    with membrane stripping       FAMILY HISTORY    Family History   Problem Relation Age of Onset    Cancer Mother         OVARIAN    Cancer Father         PROSTATE    Heart Disease Father         CAD-OPEN HEART    Other Brother 71        BLOOD CLOT TO BRAIN    Other Brother 43         IN AUTO ACCIDENT       SOCIAL HISTORY    Social History     Tobacco Use    Smoking status: Never Smoker    Smokeless tobacco: Never Used   Substance Use Topics    Alcohol use: No    Drug use: No       ALLERGIES    Allergies   Allergen Reactions    Codeine Other (See Comments)     HALLUCINATIONS    Feldene [Piroxicam] Other (See Comments)     RAISES BLOOD PRESSURE         MEDICATIONS    Current Outpatient Medications on File Prior to Encounter   Medication Sig Dispense Refill    ketoconazole (NIZORAL) 2 % cream Apply topically BID to nails and skin of both feet.  1 Tube 3    levothyroxine (SYNTHROID) 25 MCG tablet Take 25 mcg by mouth Daily      simvastatin (ZOCOR) 80 MG tablet Take 80 mg by mouth nightly      potassium bicarbonate (K-LYTE) 25 MEQ disintegrating tablet Take 25 mEq by mouth daily      allopurinol (ZYLOPRIM) 300 MG tablet Take 300 mg by mouth daily      Cholecalciferol (VITAMIN D3) 55914 units CAPS Take 1 capsule by mouth POSSIBLE     Your next appointment with [address] [place] [address] is-call if needed     ROOM TYPE   [x]?????????? CHAIR     []?????????? STRETCHER  []?????????? EITHER             (Please note your next appointment above and if you are unable to keep, kindly give a 24 hour notice. Thank you.)     If you experience any of the following, please call the [address] [place] [address] during business hours:  [phone]     * Increase in Pain  * Temperature over 101  * Increase in drainage from your wound  * Drainage with a foul odor  * Bleeding  * Increase in swelling  * Need for compression bandage changes due to slippage, breakthrough drainage.     If you need medical attention outside of the business hours of the [address] [place] [address] please contact your PCP or go to the nearest emergency room.     The information contained in the After Visit Summary has been reviewed with me, the patient and/or responsible adult, by my health care provider(s). I had the opportunity to ask questions regarding this information. I have elected to receive;      []???????? ?? After Visit Summary  [x]?????????? Comprehensive Discharge Instruction        Patient signature______________________________________Date:________  Electronically signed by Nguyễn Lantigua RN on 10/21/2020 at 9:12 AM  Electronically signed by Luis Gray DPM on 10/21/2020 at 8:40 AM          Electronically signed by Luis Gray DPM on 10/21/2020 at 9:27 AM

## (undated) DEVICE — NEEDLE FLTR 18GA L1.5IN MEM THK5UM BLNT DISP

## (undated) DEVICE — REVOLUTION DSP 25G ILM FORCEPS: Brand: ALCON GRIESHABER REVOLUTION

## (undated) DEVICE — APPLICATOR MEDICATED 26 CC SOLUTION HI LT ORNG CHLORAPREP

## (undated) DEVICE — CYCLOGEL 1% GTTS

## (undated) DEVICE — OCCLUDER EYE POLYETH HYPOALRG ADH TAPE W/O PINHOLE

## (undated) DEVICE — 6 ML SYRINGE LUER-LOCK TIP: Brand: MONOJECT

## (undated) DEVICE — MICROSURGICAL INSTRUMENT 25GA SOFT TIP CANNULA, DSP, 0.8MM: Brand: ALCON

## (undated) DEVICE — SOLUTION SURG PREP POV IOD 7.5% 4 OZ

## (undated) DEVICE — SYRINGE IRRIG 60ML SFT PLIABLE BLB EZ TO GRP 1 HND USE W/

## (undated) DEVICE — GLOVE ORTHO 7 1/2   MSG9475

## (undated) DEVICE — AGENT VISCOELASTIC 1ML 2% HYDROXYPROPYL METHCELL PRELD GLS

## (undated) DEVICE — HYPODERMIC SAFETY NEEDLE: Brand: MAGELLAN

## (undated) DEVICE — SKIN PREP TRAY W/CHG: Brand: MEDLINE INDUSTRIES, INC.

## (undated) DEVICE — 1 EACH 40411 STERILE DISPOSABLE SUPER VIEW® LENS SET & 1 EACH 40100 STERILE MICROSCOPE DRAPE: Brand: SUPER VIEW® PACK

## (undated) DEVICE — NEEDLE HYPO 25GA L1.5IN BLU POLYPR HUB S STL REG BVL STR

## (undated) DEVICE — CAUTERY BPLR 25GA INTOCU W/ HNDPC CRD DISP FOR CX9404

## (undated) DEVICE — SOLUTION IV IRRIG 500ML 0.9% SODIUM CHL 2F7123

## (undated) DEVICE — SPONGE LAP W18XL18IN WHT COT 4 PLY FLD STRUNG RADPQ DISP ST

## (undated) DEVICE — GLOVE SURG SZ 8 CRM LTX FREE POLYISOPRENE POLYMER BEAD ANTI

## (undated) DEVICE — BLANKET WRM W29.9XL79.1IN UP BODY FORC AIR MISTRAL-AIR

## (undated) DEVICE — RETINA PK

## (undated) DEVICE — PENCIL ES L3M BTTN SWCH HOLSTER W/ BLDE ELECTRD EDGE

## (undated) DEVICE — SOLUTION IRRIG 1000ML PENTALYTE PLAS POUR BTL TIS U SOL

## (undated) DEVICE — PREP SOL PVP IODINE 4%  4 OZ/BTL

## (undated) DEVICE — SURGICAL PROCEDURE PACK 25 GA VITRECTOMY

## (undated) DEVICE — CONTAINER,SPECIMEN,OR STERILE,4OZ: Brand: MEDLINE

## (undated) DEVICE — GOWN,AURORA,NONREINFORCED,LARGE: Brand: MEDLINE

## (undated) DEVICE — DRAPE,REIN 53X77,STERILE: Brand: MEDLINE

## (undated) DEVICE — GLOVE ORANGE PI 7   MSG9070

## (undated) DEVICE — PROBE OPHTH 25GA LSR CVD FLEX NIT TAPR TIP

## (undated) DEVICE — SUTURE ETHLN SZ 4-0 L18IN NONABSORBABLE BLK L19MM PS-2 3/8 1667H

## (undated) DEVICE — Device

## (undated) DEVICE — GLOVE ORANGE PI 7 1/2   MSG9075

## (undated) DEVICE — STANDARD HYPODERMIC NEEDLE,ALUMINUM HUB: Brand: MONOJECT

## (undated) DEVICE — INTENDED FOR TISSUE SEPARATION, AND OTHER PROCEDURES THAT REQUIRE A SHARP SURGICAL BLADE TO PUNCTURE OR CUT.: Brand: BARD-PARKER ® CARBON RIB-BACK BLADES